# Patient Record
Sex: FEMALE | Race: WHITE | Employment: FULL TIME | URBAN - METROPOLITAN AREA
[De-identification: names, ages, dates, MRNs, and addresses within clinical notes are randomized per-mention and may not be internally consistent; named-entity substitution may affect disease eponyms.]

---

## 2017-01-12 ENCOUNTER — ROUTINE PRENATAL (OUTPATIENT)
Dept: OBGYN CLINIC | Age: 32
End: 2017-01-12

## 2017-01-12 VITALS
SYSTOLIC BLOOD PRESSURE: 114 MMHG | HEIGHT: 68 IN | WEIGHT: 234 LBS | BODY MASS INDEX: 35.46 KG/M2 | DIASTOLIC BLOOD PRESSURE: 70 MMHG | RESPIRATION RATE: 19 BRPM

## 2017-01-12 DIAGNOSIS — Z34.83 ENCOUNTER FOR SUPERVISION OF OTHER NORMAL PREGNANCY IN THIRD TRIMESTER: Primary | ICD-10-CM

## 2017-01-12 LAB
GTT, 1 HR, GLUCOLA, EXTERNAL: 126
HCT, EXTERNAL: 32.8
HGB, EXTERNAL: 10.9
PLATELET CNT,   EXTERNAL: 235

## 2017-01-12 NOTE — PROGRESS NOTES
_ [facility]  [email or web address]  [phone]    Virgilio Merchant MD, FACOG     Follow-up OB visit    Vitals:    17 1448   BP: 114/70   Resp: 19   Weight: 234 lb (106.1 kg)   Height: 5' 8\" (1.727 m)       The patient reports the following concerns: none    Flu vaccine: received this season  Tdap: next visit  (select n/a if first or second trimester)    See PN flowsheet for exam    33 y.o.  29w5d   Encounter Diagnosis   Name Primary?     Encounter for supervision of other normal pregnancy in third trimester Yes        [] SAB/bleeding precautions reviewed   [] PTL/PPROM precautions reviewed   [] Labor precautions reviewed   [] Fetal kick counts discussed   [] Labs reviewed with patient   [] Herbert Loyd precautions reviewed   [] Consent reviewed   [] Handouts given to pt   [] Glucola handout    [] GBS/labor/Magic Hour handout   []    []    []    []    Follow-up Disposition: Not on File    Orders Placed This Encounter    GLUCOSE, GESTATIONAL, 1 HR TOLERANCE    CBC W/O DIFF       Virgilio Merchant MD

## 2017-01-12 NOTE — MR AVS SNAPSHOT
Visit Information Date & Time Provider Department Dept. Phone Encounter #  
 1/12/2017  2:40 PM Casey Allen MD [facility] [phone] Upcoming Health Maintenance Date Due  
 PAP AKA CERVICAL CYTOLOGY 8/18/2019 Allergies as of 1/12/2017  Review Complete On: 1/12/2017 By: Andrea Luna Severity Noted Reaction Type Reactions Penicillins  05/12/2013    Rash, Nausea and Vomiting  
 Sulfa (Sulfonamide Antibiotics)  05/12/2013    Hives Current Immunizations  Never Reviewed Name Date Influenza Vaccine (Quad) PF 10/11/2016 Tdap 5/14/2013 12:35 PM  
  
 Not reviewed this visit You Were Diagnosed With   
  
 Codes Comments Encounter for supervision of other normal pregnancy in third trimester    -  Primary ICD-10-CM: Z34.83 Vitals BP Resp Height(growth percentile) Weight(growth percentile) LMP BMI  
 114/70 (BP 1 Location: Right arm, BP Patient Position: Sitting) 19 5' 8\" (1.727 m) 234 lb (106.1 kg) 06/20/2016 (Exact Date) 35.58 kg/m2 OB Status Smoking Status Pregnant Never Smoker BMI and BSA Data Body Mass Index Body Surface Area 35.58 kg/m 2 2.26 m 2 Preferred Pharmacy Pharmacy Name Phone [id number] [phone] , [address] [place] [address]  [phone] Your Updated Medication List  
  
   
This list is accurate as of: 1/12/17  2:53 PM.  Always use your most recent med list.  
  
  
  
  
 calcium carbonate 400 mg [facility] Commonly known as:  MYLANTA Take 1 Tab by mouth three (3) times daily (with meals). PRENATAL 19 (WITH DOCUSATE) 29-1 mg Tab Generic drug:  prenatal vit-fe fum-fa-dss Take  by mouth. We Performed the Following CBC W/O DIFF [92841 CPT(R)] GLUCOSE, GESTATIONAL, 1 HR TOLERANCE [63374 CPT(R)] Patient Instructions Learning About Glucose Testing During Pregnancy What is a glucose test? 
 A glucose test measures the body's ability to use a type of sugar, called glucose. Glucose is the body's main source of energy. This test is used to check pregnant women for gestational diabetes. Gestational diabetes is a form of diabetes that develops during pregnancy and then usually goes away after the baby is born. When you have this condition, the insulin in your body is not able to keep your blood sugar in a normal range. If you do not control your blood sugar, your baby can grow too big and may have problems after birth. How is a glucose test done? There are different ways to test for gestational diabetes. One method is done in two steps. 1. You do not need to stop eating or drinking before the first step. You will drink a liquid that contains 50 grams of sugar (glucose). Your blood sample is taken 1 hour later. If you don't have a lot of sugar in your blood, you do not have gestational diabetes. 2. If you do have a lot of sugar in your blood, you are asked to do the second step, the oral glucose tolerance test (OGTT). With the OGTT, you cannot eat or drink for at least 8 hours before the test. Then a blood sample is taken when you arrive for the test. This is your fasting blood glucose value. It provides a baseline for comparing other glucose values. You drink a liquid that contains 100 grams of sugar (glucose). Your blood sample is taken 3 hours later to see how much sugar is in your blood. If you don't have a lot of sugar in your blood, you don't have gestational diabetes. If you do have a lot of sugar in your blood, you do have gestational diabetes. A different method is done in one step. It is another version of the OGTT. You cannot eat or drink for at least 8 hours before the test. Then a blood sample is taken when you arrive for the test. This is your fasting blood glucose value. It provides a baseline for comparing other glucose values. You drink a liquid that contains 75 grams of sugar (glucose). Your blood sample is taken 1 and then 2 hours later to see how much sugar is in your blood. If you don't have a lot of sugar in your blood, you do not have gestational diabetes. If you do have a lot of sugar in your blood, you do have gestational diabetes. What else should you know about the test? 
Your blood glucose level may drop very low toward the end of the glucose test. If this happens, you may feel weak, hungry, and restless. Tell your doctor if you have these symptoms. The test usually will be stopped. You may vomit after drinking the sweet liquid. If this happens, the test may need to be repeated at a later time. Your doctor may do more glucose tests at different times during your pregnancy. Follow-up care is a key part of your treatment and safety. Be sure to make and go to all appointments, and call your doctor if you are having problems. It's also a good idea to know your test results and keep a list of the medicines you take. Where can you learn more? Go to http://ryne-marvin.info/. Enter V980 in the search box to learn more about \"Learning About Glucose Testing During Pregnancy. \" Current as of: Rhiannon 15, 2016 Content Version: 11.1 © 9920-8477 Location, Incorporated. Care instructions adapted under license by Fipeo (which disclaims liability or warranty for this information). If you have questions about a medical condition or this instruction, always ask your healthcare professional. [place] [address] any warranty or liability for your use of this information. Introducing [facility] & HEALTH SERVICES! Dear Aneesh Chamorro: Thank you for requesting a Espion Limited account. Our records indicate that you already have an active Espion Limited account. You can access your account anytime at [email or web address] Did you know that you can access your hospital and ER discharge instructions at any time in Golimi? You can also review all of your test results from your hospital stay or ER visit. Additional Information If you have questions, please visit the Frequently Asked Questions section of the Golimi website at [email or web address] and some sugar. [email or web address] Remember, Golimi is NOT to be used for urgent needs. For medical emergencies, dial 911. Now available from your iPhone and Android! Please provide this summary of care documentation to your next provider. Your primary care clinician is listed as [place]. If you have any questions after today's visit, please call [phone].

## 2017-01-12 NOTE — PATIENT INSTRUCTIONS
Learning About Glucose Testing During Pregnancy  What is a glucose test?  A glucose test measures the body's ability to use a type of sugar, called glucose. Glucose is the body's main source of energy. This test is used to check pregnant women for gestational diabetes. Gestational diabetes is a form of diabetes that develops during pregnancy and then usually goes away after the baby is born. When you have this condition, the insulin in your body is not able to keep your blood sugar in a normal range. If you do not control your blood sugar, your baby can grow too big and may have problems after birth. How is a glucose test done? There are different ways to test for gestational diabetes. One method is done in two steps. 1. You do not need to stop eating or drinking before the first step. You will drink a liquid that contains 50 grams of sugar (glucose). Your blood sample is taken 1 hour later. If you don't have a lot of sugar in your blood, you do not have gestational diabetes. 2. If you do have a lot of sugar in your blood, you are asked to do the second step, the oral glucose tolerance test (OGTT). With the OGTT, you cannot eat or drink for at least 8 hours before the test. Then a blood sample is taken when you arrive for the test. This is your fasting blood glucose value. It provides a baseline for comparing other glucose values. You drink a liquid that contains 100 grams of sugar (glucose). Your blood sample is taken 3 hours later to see how much sugar is in your blood. If you don't have a lot of sugar in your blood, you don't have gestational diabetes. If you do have a lot of sugar in your blood, you do have gestational diabetes. A different method is done in one step. It is another version of the OGTT. You cannot eat or drink for at least 8 hours before the test. Then a blood sample is taken when you arrive for the test. This is your fasting blood glucose value.  It provides a baseline for comparing other glucose values. You drink a liquid that contains 75 grams of sugar (glucose). Your blood sample is taken 1 and then 2 hours later to see how much sugar is in your blood. If you don't have a lot of sugar in your blood, you do not have gestational diabetes. If you do have a lot of sugar in your blood, you do have gestational diabetes. What else should you know about the test?  Your blood glucose level may drop very low toward the end of the glucose test. If this happens, you may feel weak, hungry, and restless. Tell your doctor if you have these symptoms. The test usually will be stopped. You may vomit after drinking the sweet liquid. If this happens, the test may need to be repeated at a later time. Your doctor may do more glucose tests at different times during your pregnancy. Follow-up care is a key part of your treatment and safety. Be sure to make and go to all appointments, and call your doctor if you are having problems. It's also a good idea to know your test results and keep a list of the medicines you take. Where can you learn more? Go to http://ryne-marvin.info/. Enter T104 in the search box to learn more about \"Learning About Glucose Testing During Pregnancy. \"  Current as of: Rhiannon 15, 2016  Content Version: 11.1  © 0170-2004 Smart Furniture, Incorporated. Care instructions adapted under license by Creative Market (which disclaims liability or warranty for this information). If you have questions about a medical condition or this instruction, always ask your healthcare professional. [place] [address] any warranty or liability for your use of this information.

## 2017-01-13 LAB
ERYTHROCYTE [DISTWIDTH] IN BLOOD BY AUTOMATED COUNT: 13 % (ref 12.3–15.4)
GLUCOSE 1H P 50 G GLC PO SERPL-MCNC: 126 MG/DL (ref 65–139)
HCT VFR BLD AUTO: 32.8 % (ref 34–46.6)
HGB BLD-MCNC: 10.9 G/DL (ref 11.1–15.9)
MCH RBC QN AUTO: 30.9 PG (ref 26.6–33)
MCHC RBC AUTO-ENTMCNC: 33.2 G/DL (ref 31.5–35.7)
MCV RBC AUTO: 93 FL (ref 79–97)
PLATELET # BLD AUTO: 235 X10E3/UL (ref 150–379)
RBC # BLD AUTO: 3.53 X10E6/UL (ref 3.77–5.28)
WBC # BLD AUTO: 8.6 X10E3/UL (ref 3.4–10.8)

## 2017-01-16 NOTE — PROGRESS NOTES
28 week labs OK except anemia. Recommend iron per protocol. Notify patient and add to PN Labs.   Add #.# hgb (g/dL) to pregnancy  problem list

## 2017-01-17 NOTE — PROGRESS NOTES
PNL/PL updated.   Patient notified via Genomast and was Read by Galileo First at 1/16/2017 11:21 AM.

## 2017-01-26 ENCOUNTER — ROUTINE PRENATAL (OUTPATIENT)
Dept: OBGYN CLINIC | Age: 32
End: 2017-01-26

## 2017-01-26 VITALS
WEIGHT: 236 LBS | HEIGHT: 68 IN | BODY MASS INDEX: 35.77 KG/M2 | SYSTOLIC BLOOD PRESSURE: 114 MMHG | DIASTOLIC BLOOD PRESSURE: 72 MMHG

## 2017-01-26 DIAGNOSIS — Z34.80 PRENATAL CARE OF MULTIGRAVIDA, ANTEPARTUM: Primary | ICD-10-CM

## 2017-01-26 NOTE — PATIENT INSTRUCTIONS
Weeks 30 to 32 of Your Pregnancy: Care Instructions  Your Care Instructions    You have made it to the final months of your pregnancy. By now, your baby is really starting to look like a baby, with hair and plump skin. As you enter the final weeks of pregnancy, the reality of having a baby may start to set in. This is the time to settle on a name, get your household in order, set up a safe nursery, and find quality  if needed. Doing these things in advance will allow you to focus on caring for and enjoying your new baby. You may also want to have a tour of your hospital's labor and delivery unit to get a better idea of what to expect while you are in the hospital.  During these last months, it is very important to take good care of yourself and pay attention to what your body needs. If your doctor says it is okay for you to work, don't push yourself too hard. Use the tips provided in this care sheet to ease heartburn and care for varicose veins. If you haven't already had the Tdap shot during this pregnancy, talk to your doctor about getting it. It will help protect your  against pertussis infection. Follow-up care is a key part of your treatment and safety. Be sure to make and go to all appointments, and call your doctor if you are having problems. It's also a good idea to know your test results and keep a list of the medicines you take. How can you care for yourself at home? Pay attention to your baby's movements  · You should feel your baby move several times every day. · Your baby now turns less, and kicks and jabs more. · Your baby sleeps 20 to 45 minutes at a time and is more active at certain times of day. · If your doctor wants you to count your baby's kicks:  ¨ Empty your bladder, and lie on your side or relax in a comfortable chair. ¨ Write down your start time. ¨ Pay attention only to your baby's movements. Count any movement except hiccups.   ¨ After you have counted 10 movements, write down your stop time. ¨ Write down how many minutes it took for your baby to move 10 times. ¨ If an hour goes by and you have not recorded 10 movements, have something to eat or drink and then count for another hour. If you do not record 10 movements in either hour, call your doctor. Ease heartburn  · Eat small, frequent meals. · Do not eat chocolate, peppermint, or very spicy foods. Avoid drinks with caffeine, such as coffee, tea, and sodas. · Avoid bending over or lying down after meals. · Talk a short walk after you eat. · If heartburn is a problem at night, do not eat for 2 hours before bedtime. · Take antacids like Mylanta, Maalox, Rolaids, or Tums. Do not take antacids that have sodium bicarbonate. Care for varicose veins  · Varicose veins are blood vessels that stretch out with the extra blood during pregnancy. Your legs may ache or throb. Most varicose veins will go away after the birth. · Avoid standing for long periods of time. Sit with your legs crossed at the ankles, not the knees. · Sit with your feet propped up. · Avoid tight clothing or stockings. Wear support hose. · Exercise regularly. Try walking for at least 30 minutes a day. Where can you learn more? Go to http://ryne-marvin.info/. Enter X525 in the search box to learn more about \"Weeks 30 to 32 of Your Pregnancy: Care Instructions. \"  Current as of: May 30, 2016  Content Version: 11.1  © 4588-6429 NGI. Care instructions adapted under license by Sopheon (which disclaims liability or warranty for this information). If you have questions about a medical condition or this instruction, always ask your healthcare professional. [place] [address] any warranty or liability for your use of this information.

## 2017-01-26 NOTE — PROGRESS NOTES
_ [facility]  [email or web address]  [phone]    Casey Rojo MD, FACOG     Follow-up OB visit    Vitals:    17 1314   BP: 114/72   Weight: 236 lb (107 kg)   Height: 5' 8\" (1.727 m)       The patient reports the following concerns: none    Flu vaccine: received this season  Tdap: would like to recieve at next visit. (select n/a if first or second trimester)    See PN flowsheet for exam  Chest  · Respiratory Effort: breathing normal        Auscultation: normal breath sounds, clear bilaterally    Cardiovascular  · Heart:  · Auscultation: regular rate and rhythm without murmur, normal S1, S2    Ext: no c/t/ trace edema b/l    33 y.o.  31w5d   Encounter Diagnosis   Name Primary?  Prenatal care of multigravida, antepartum Yes     Disc typical dyspnea of pregnancy, notify MD if NI  Dvt/pe precautions   [] SAB/bleeding precautions reviewed   [] PTL/PPROM precautions reviewed   [] Labor precautions reviewed   [] Fetal kick counts discussed   [] Labs reviewed with patient   [] Paul Kand precautions reviewed   [] Consent reviewed   [] Handouts given to pt   [] Glucola handout    [] GBS/labor/Magic Hour handout   []    []    []    []    Follow-up Disposition:  Return in about 2 weeks (around 2017) for Follow up OB visit. No orders of the defined types were placed in this encounter.       Casey Rojo MD

## 2017-01-26 NOTE — MR AVS SNAPSHOT
Visit Information Date & Time Provider Department Dept. Phone Encounter #  
 1/26/2017  1:10 PM Shara Sevilla MD [facility] 90 [id number] Follow-up Instructions Return in about 2 weeks (around 2/9/2017) for Follow up OB visit. Your Appointments 2/9/2017  1:10 PM  
OB VISIT with MD Harsha Ellison ([address] [place] [address]) Appt Note: 2wk fob  34wks   TP  
 [address] [place] [address]  
[phone]  
  
   
 [address] [place] [address]  
  
    
 2/16/2017  1:00 PM  
ULTRASOUND with Army Bailey Harsha Gilmore ([address] [place] [address]) Appt Note: 1wk fob with growth check u/s    TP  
 [address] [place] [address]  
[phone]  
  
   
 [address] [place] [address]  
  
    
 2/16/2017  1:30 PM  
OB VISIT with MD Harsha Ellison ([address] [place] [address] Road) Appt Note: 1wk fob with growth check u/s    TP  
 [address] [place] [address]  
[phone] Upcoming Health Maintenance Date Due  
 PAP AKA CERVICAL CYTOLOGY 8/18/2019 Allergies as of 1/26/2017  Review Complete On: 1/26/2017 By: Jacy Mayer LPN Severity Noted Reaction Type Reactions Penicillins  05/12/2013    Rash, Nausea and Vomiting  
 Sulfa (Sulfonamide Antibiotics)  05/12/2013    Hives Current Immunizations  Never Reviewed Name Date Influenza Vaccine (Quad) PF 10/11/2016 Tdap 5/14/2013 12:35 PM  
  
 Not reviewed this visit You Were Diagnosed With   
  
 Codes Comments Prenatal care of multigravida, antepartum    -  Primary ICD-10-CM: Z34.80 ICD-9-CM: V22.1 Vitals BP Height(growth percentile) Weight(growth percentile) LMP BMI OB Status 114/72 5' 8\" (1.727 m) 236 lb (107 kg) 06/20/2016 (Exact Date) 35.88 kg/m2 Pregnant Smoking Status Never Smoker BMI and BSA Data Body Mass Index Body Surface Area  
 35.88 kg/m 2 2.27 m 2 Preferred Pharmacy Pharmacy Name Phone [id number] [address], [id number] [place] [address]  [phone] Your Updated Medication List  
  
   
This list is accurate as of: 17  1:15 PM.  Always use your most recent med list.  
  
  
  
  
 calcium carbonate 400 mg Chew Commonly known as:  MYLANTA Take 1 Tab by mouth three (3) times daily (with meals). PRENATAL 19 (WITH DOCUSATE) 29-1 mg Tab Generic drug:  prenatal vit-fe fum-fa-dss Take  by mouth. Follow-up Instructions Return in about 2 weeks (around 2017) for Follow up OB visit. Patient Instructions Weeks 30 to 32 of Your Pregnancy: Care Instructions Your Care Instructions You have made it to the final months of your pregnancy. By now, your baby is really starting to look like a baby, with hair and plump skin. As you enter the final weeks of pregnancy, the reality of having a baby may start to set in. This is the time to settle on a name, get your household in order, set up a safe nursery, and find quality  if needed. Doing these things in advance will allow you to focus on caring for and enjoying your new baby. You may also want to have a tour of your hospital's labor and delivery unit to get a better idea of what to expect while you are in the hospital. 
During these last months, it is very important to take good care of yourself and pay attention to what your body needs. If your doctor says it is okay for you to work, don't push yourself too hard. Use the tips provided in this care sheet to ease heartburn and care for varicose veins. If you haven't already had the Tdap shot during this pregnancy, talk to your doctor about getting it. It will help protect your  against pertussis infection. Follow-up care is a key part of your treatment and safety. Be sure to make and go to all appointments, and call your doctor if you are having problems. It's also a good idea to know your test results and keep a list of the medicines you take. How can you care for yourself at home? Pay attention to your baby's movements · You should feel your baby move several times every day. · Your baby now turns less, and kicks and jabs more. · Your baby sleeps 20 to 45 minutes at a time and is more active at certain times of day. · If your doctor wants you to count your baby's kicks: 
¨ Empty your bladder, and lie on your side or relax in a comfortable chair. ¨ Write down your start time. ¨ Pay attention only to your baby's movements. Count any movement except hiccups. ¨ After you have counted 10 movements, write down your stop time. ¨ Write down how many minutes it took for your baby to move 10 times. ¨ If an hour goes by and you have not recorded 10 movements, have something to eat or drink and then count for another hour. If you do not record 10 movements in either hour, call your doctor. Ease heartburn · Eat small, frequent meals. · Do not eat chocolate, peppermint, or very spicy foods. Avoid drinks with caffeine, such as coffee, tea, and sodas. · Avoid bending over or lying down after meals. · Talk a short walk after you eat. · If heartburn is a problem at night, do not eat for 2 hours before bedtime. · Take antacids like Mylanta, Maalox, Rolaids, or Tums. Do not take antacids that have sodium bicarbonate. Care for varicose veins · Varicose veins are blood vessels that stretch out with the extra blood during pregnancy. Your legs may ache or throb. Most varicose veins will go away after the birth. · Avoid standing for long periods of time. Sit with your legs crossed at the ankles, not the knees. · Sit with your feet propped up. · Avoid tight clothing or stockings. Wear support hose. · Exercise regularly. Try walking for at least 30 minutes a day. Where can you learn more? Go to http://ryne-marvin.info/. Enter P939 in the search box to learn more about \"Weeks 30 to 32 of Your Pregnancy: Care Instructions. \" Current as of: May 30, 2016 Content Version: 11.1 © 2585-2677 Resilinc. Care instructions adapted under license by StarNet Interactive (which disclaims liability or warranty for this information). If you have questions about a medical condition or this instruction, always ask your healthcare professional. [address] any warranty or liability for your use of this information. Introducing hospitals & HEALTH SERVICES! Dear Bob Ozuna: Thank you for requesting a Jovie account. Our records indicate that you already have an active Jovie account. You can access your account anytime at [email or web address] Did you know that you can access your hospital and ER discharge instructions at any time in Jovie? You can also review all of your test results from your hospital stay or ER visit. Additional Information If you have questions, please visit the Frequently Asked Questions section of the Jovie website at [email or web address] Remember, Jovie is NOT to be used for urgent needs. For medical emergencies, dial 911. Now available from your iPhone and Android! Please provide this summary of care documentation to your next provider. Your primary care clinician is listed as Nevaeh Dhaliwal. If you have any questions after today's visit, please call [phone].

## 2017-02-06 ENCOUNTER — TELEPHONE (OUTPATIENT)
Dept: OBGYN CLINIC | Age: 32
End: 2017-02-06

## 2017-02-06 NOTE — TELEPHONE ENCOUNTER
Humberto Arias, then I would just try to manage the sx with OTC meds, fluids and rest.  I hope she feels better soon!

## 2017-02-06 NOTE — TELEPHONE ENCOUNTER
Yes: She can take tamiflu in pregnancy. Rx: 75mg po BID x 5 days     To help with symptoms if tests flu +  But should be started within 48 hours of onset of symptoms, or then it may not be worth it. I hope she feels better soon!   Rec PCP fu if NI in sx  Agree with safer meds for sx in pregnancy: refer to website/new OB list    trp

## 2017-02-06 NOTE — TELEPHONE ENCOUNTER
11: 25AM   31 WKS pt called and stated she was seen at the [address] and tested +Flu B and was told she cannot take Tamiflu:  Drug C Category. C/O fever,cough,sinus pressure and HA. Advised pt of the following OTC medications for pregnancy:  Robitussin plain or DM  Tylenol(acetaminophen)  Sudafed(regular or 12 hours)  Sucrets and Chlor-Trimeton  She verbalized understanding and stated she had the FLU vaccine in October 2016. Please advise.

## 2017-02-09 ENCOUNTER — ROUTINE PRENATAL (OUTPATIENT)
Dept: OBGYN CLINIC | Age: 32
End: 2017-02-09

## 2017-02-09 VITALS
WEIGHT: 238 LBS | SYSTOLIC BLOOD PRESSURE: 112 MMHG | DIASTOLIC BLOOD PRESSURE: 78 MMHG | HEIGHT: 68 IN | BODY MASS INDEX: 36.07 KG/M2

## 2017-02-09 DIAGNOSIS — Z34.80 PRENATAL CARE OF MULTIGRAVIDA, ANTEPARTUM: Primary | ICD-10-CM

## 2017-02-09 RX ORDER — ACETAMINOPHEN 325 MG/1
TABLET ORAL
COMMUNITY
End: 2017-03-23

## 2017-02-09 NOTE — PROGRESS NOTES
_ [facility]  [email or web address]  [phone]    Eric Allen MD, FACOG     Follow-up OB visit    Chief Complaint   Patient presents with    Routine Prenatal Visit       Vitals:    17 1325   BP: 112/78   Weight: 238 lb (108 kg)   Height: 5' 8\" (1.727 m)       Patient Active Problem List    Diagnosis Date Noted    Varicose vein of leg 2016    Pregnancy 2016    Abdominal pain in pregnancy 2013       The patient reports the following concerns: Patient tested positive for the Flu on Monday and had a fever Saturday through Monday. Patient has been taking tylenol, robitussin, and sudafed and states she has not had any relief. Patient reports feeling like she pulled a muscle on the right side of her abdomen from coughing. Flu vaccine: received this season  Tdap: will recieve at next visit due to having the Flu today. See PN flowsheet for exam    33 y.o.  33w5d   Encounter Diagnosis   Name Primary?  Prenatal care of multigravida, antepartum Yes     Viral prec  Note for work today/tomorrow   [] SAB/bleeding precautions reviewed   [x] PTL/PPROM precautions reviewed   [] Labor precautions reviewed   [] Fetal kick counts discussed   [] Labs reviewed with patient   [] Jeanne Saybozena precautions reviewed   [] Consent reviewed   [] Handouts given to pt   [] Glucola handout    [] GBS/labor/Magic Hour handout   []    []    []    []    Follow-up Disposition:  Return in about 1 week (around 2017) for Follow up OB visit. No orders of the defined types were placed in this encounter.       Eric Allen MD

## 2017-02-09 NOTE — LETTER
2/9/2017 1:39 PM 
 
Ms. Kia Sullivan [phone] To Whom it may concern; 
 
 Roxana Boo is under my care. She was seen in our office today 2/9/17. Following this appointment today, the doctor has recommended her stay out of work for the remainder of today 2/9/17 and all of tomorrow 2/10/17. Sincerely, Bernard Chua MD

## 2017-02-09 NOTE — PATIENT INSTRUCTIONS

## 2017-02-09 NOTE — MR AVS SNAPSHOT
Visit Information Date & Time Provider Department Dept. Phone Encounter #  
 2/9/2017  1:10 PM Frantz Lee MD [facility] 90 [id number] Your Appointments 2/16/2017  2:30 PM  
ULTRASOUND with MARILU Rubio ([address]) Appt Note: 1wk fob with growth check u/s TP  
 [address] [place] [address]  
[phone]  
  
   
 [facility] [address]  
  
    
 2/16/2017  3:00 PM  
OB VISIT with MD Harsha Alford ([address]) Appt Note: 1wk fob with growth check u/s TP  
 [address] [phone]  
[facility] [address] Upcoming Health Maintenance Date Due  
 PAP AKA CERVICAL CYTOLOGY 8/18/2019 Allergies as of 2/9/2017  Review Complete On: 2/9/2017 By: Frank Noble LPN Severity Noted Reaction Type Reactions Penicillins  05/12/2013    Rash, Nausea and Vomiting  
 Sulfa (Sulfonamide Antibiotics)  05/12/2013    Hives Current Immunizations  Reviewed on 2/6/2017 Name Date Influenza Vaccine (Quad) PF 10/11/2016 Tdap 5/14/2013 12:35 PM  
  
 Not reviewed this visit Vitals BP Height(growth percentile) Weight(growth percentile) LMP BMI OB Status 112/78 5' 8\" (1.727 m) 238 lb (108 kg) 06/20/2016 (Exact Date) 36.19 kg/m2 Pregnant Smoking Status Never Smoker BMI and BSA Data Body Mass Index Body Surface Area  
 36.19 kg/m 2 2.28 m 2 Preferred Pharmacy Pharmacy Name Phone [id number] [address] [facility], [address] [place] [address]  [phone] Your Updated Medication List  
  
   
This list is accurate as of: 2/9/17  1:29 PM.  Always use your most recent med list.  
  
  
  
  
 calcium carbonate 400 mg [address] [facility] Commonly known as:  MYLANTA Take 1 Tab by mouth three (3) times daily (with meals). PRENATAL 19 (WITH DOCUSATE) 29-1 mg Tab Generic drug:  prenatal vit-fe fum-fa-dss Take  by mouth. ROBITUSSIN PE PO Take  by mouth. SUDAFED PO Take  by mouth. TYLENOL 325 mg tablet Generic drug:  acetaminophen Take  by mouth every four (4) hours as needed for Pain. Patient Instructions Weeks 34 to 36 of Your Pregnancy: Care Instructions Your Care Instructions By now, your baby and your belly have grown quite large. It is almost time to give birth. A full-term pregnancy can deliver between 37 and 42 weeks. Your baby's lungs are almost ready to breathe air. The bones in your baby's head are now firm enough to protect it, but soft enough to move down through the birth canal. 
You may feel excited, happy, anxious, or scared. You may wonder how you will know if you are in labor or what to expect during labor. Try to be flexible in your expectations of the birth. Because each birth is different, there is no way to know exactly what childbirth will be like for you. This care sheet will help you know what to expect and how to prepare. This may make your childbirth easier. If you haven't already had the Tdap shot during this pregnancy, talk to your doctor about getting it. It will help protect your  against pertussis infection. In the 36th week, most women have a test for group B streptococcus (GBS). GBS is a common bacteria that can live in the vagina and rectum. It can make your baby sick after birth. If you test positive, you will get antibiotics during labor. The medicine will keep your baby from getting the bacteria. Follow-up care is a key part of your treatment and safety. Be sure to make and go to all appointments, and call your doctor if you are having problems. It's also a good idea to know your test results and keep a list of the medicines you take. How can you care for yourself at home? Learn about pain relief choices · Pain is different for every woman. Talk with your doctor about your feelings about pain. · You can choose from several types of pain relief. These include medicine or breathing techniques, as well as comfort measures. You can use more than one option. · If you choose to have pain medicine during labor, talk to your doctor about your options. Some medicines lower anxiety and help with some of the pain. Others make your lower body numb so that you won't feel pain. · Be sure to tell your doctor about your pain medicine choice before you start labor or very early in your labor. You may be able to change your mind as labor progresses. · Rarely, a woman is put to sleep by medicine given through a mask or an IV. Labor and delivery · The first stage of labor has three parts: early, active, and transition. ¨ Most women have early labor at home. You can stay busy or rest, eat light snacks, drink clear fluids, and start counting contractions. ¨ When talking during a contraction gets hard, you may be moving to active labor. During active labor, you should head for the hospital if you are not there already. ¨ You are in active labor when contractions come every 3 to 4 minutes and last about 60 seconds. Your cervix is opening more rapidly. ¨ If your water breaks, contractions will come faster and stronger. ¨ During transition, your cervix is stretching, and contractions are coming more rapidly. ¨ You may want to push, but your cervix might not be ready. Your doctor will tell you when to push. · The second stage starts when your cervix is completely opened and you are ready to push. ¨ Contractions are very strong to push the baby down the birth canal. 
¨ You will feel the urge to push. You may feel like you need to have a bowel movement. ¨ You may be coached to push with contractions. These contractions will be very strong, but you will not have them as often.  You can get a little rest between contractions. ¨ You may be emotional and irritable. You may not be aware of what is going on around you. ¨ One last push, and your baby is born. · The third stage is when a few more contractions push out the placenta. This may take 30 minutes or less. · The fourth stage is the welcome recovery. You may feel overwhelmed with emotions and exhausted but alert. This is a good time to start breastfeeding. Where can you learn more? Go to http://ryne-marvin.info/. Enter B563 in the search box to learn more about \"Weeks 34 to 36 of Your Pregnancy: Care Instructions. \" Current as of: May 30, 2016 Content Version: 11.1 © 7992-6788 [employer]. Care instructions adapted under license by [employer] (which disclaims liability or warranty for this information). If you have questions about a medical condition or this instruction, always ask your healthcare professional. [place] [address] any warranty or liability for your use of this information. Introducing [facility] & HEALTH SERVICES! Dear Mayco Celis: Thank you for requesting a Cybronics account. Our records indicate that you already have an active Cybronics account. You can access your account anytime at [email or web address] Did you know that you can access your hospital and ER discharge instructions at any time in Cybronics? You can also review all of your test results from your hospital stay or ER visit. Additional Information If you have questions, please visit the Frequently Asked Questions section of the Cybronics website at [email or web address] Remember, Cybronics is NOT to be used for urgent needs. For medical emergencies, dial 911. Now available from your iPhone and Android! Please provide this summary of care documentation to your next provider. Your primary care clinician is listed as Arelis Urrutia.  If you have any questions after today's visit, please call [phone].

## 2017-02-16 ENCOUNTER — ROUTINE PRENATAL (OUTPATIENT)
Dept: OBGYN CLINIC | Age: 32
End: 2017-02-16

## 2017-02-16 VITALS
SYSTOLIC BLOOD PRESSURE: 108 MMHG | WEIGHT: 237 LBS | DIASTOLIC BLOOD PRESSURE: 64 MMHG | BODY MASS INDEX: 35.92 KG/M2 | HEIGHT: 68 IN

## 2017-02-16 DIAGNOSIS — Z23 ENCOUNTER FOR IMMUNIZATION: ICD-10-CM

## 2017-02-16 DIAGNOSIS — Z34.80 PRENATAL CARE OF MULTIGRAVIDA, ANTEPARTUM: Primary | ICD-10-CM

## 2017-02-16 NOTE — PATIENT INSTRUCTIONS

## 2017-02-16 NOTE — MR AVS SNAPSHOT
Visit Information Date & Time Provider Department Dept. Phone Encounter #  
 2/16/2017  3:00 PM [facility] [id number] Upcoming Health Maintenance Date Due  
 PAP AKA CERVICAL CYTOLOGY 8/18/2019 Allergies as of 2/16/2017  Review Complete On: 2/16/2017 By: Carolina Quinn LPN Severity Noted Reaction Type Reactions Penicillins  05/12/2013    Rash, Nausea and Vomiting  
 Sulfa (Sulfonamide Antibiotics)  05/12/2013    Hives Current Immunizations  Reviewed on 2/6/2017 Name Date Influenza Vaccine (Quad) PF 10/11/2016 Tdap 5/14/2013 12:35 PM  
  
 Not reviewed this visit Vitals BP Height(growth percentile) Weight(growth percentile) LMP BMI OB Status 108/64 5' 8\" (1.727 m) 237 lb (107.5 kg) 06/20/2016 (Exact Date) 36.04 kg/m2 Pregnant Smoking Status Never Smoker Vitals History BMI and BSA Data Body Mass Index Body Surface Area 36.04 kg/m 2 2.27 m 2 Preferred Pharmacy Pharmacy Name Phone [id number] [phone]  [phone] Your Updated Medication List  
  
   
This list is accurate as of: 2/16/17  3:25 PM.  Always use your most recent med list.  
  
  
  
  
 calcium carbonate 400 mg Rickey Valderrama Commonly known as:  MYLANTA Take 1 Tab by mouth three (3) times daily (with meals). PRENATAL 19 (WITH DOCUSATE) 29-1 mg Tab Generic drug:  prenatal vit-fe fum-fa-dss Take  by mouth. ROBITUSSIN PE PO Take  by mouth. SUDAFED PO Take  by mouth. TYLENOL 325 mg tablet Generic drug:  acetaminophen Take  by mouth every four (4) hours as needed for Pain. Patient Instructions Weeks 34 to 36 of Your Pregnancy: Care Instructions Your Care Instructions By now, your baby and your belly have grown quite large.  It is almost time to give birth. A full-term pregnancy can deliver between 37 and 42 weeks. Your baby's lungs are almost ready to breathe air. The bones in your baby's head are now firm enough to protect it, but soft enough to move down through the birth canal. 
You may feel excited, happy, anxious, or scared. You may wonder how you will know if you are in labor or what to expect during labor. Try to be flexible in your expectations of the birth. Because each birth is different, there is no way to know exactly what childbirth will be like for you. This care sheet will help you know what to expect and how to prepare. This may make your childbirth easier. If you haven't already had the Tdap shot during this pregnancy, talk to your doctor about getting it. It will help protect your  against pertussis infection. In the 36th week, most women have a test for group B streptococcus (GBS). GBS is a common bacteria that can live in the vagina and rectum. It can make your baby sick after birth. If you test positive, you will get antibiotics during labor. The medicine will keep your baby from getting the bacteria. Follow-up care is a key part of your treatment and safety. Be sure to make and go to all appointments, and call your doctor if you are having problems. It's also a good idea to know your test results and keep a list of the medicines you take. How can you care for yourself at home? Learn about pain relief choices · Pain is different for every woman. Talk with your doctor about your feelings about pain. · You can choose from several types of pain relief. These include medicine or breathing techniques, as well as comfort measures. You can use more than one option. · If you choose to have pain medicine during labor, talk to your doctor about your options. Some medicines lower anxiety and help with some of the pain. Others make your lower body numb so that you won't feel pain. · Be sure to tell your doctor about your pain medicine choice before you start labor or very early in your labor. You may be able to change your mind as labor progresses. · Rarely, a woman is put to sleep by medicine given through a mask or an IV. Labor and delivery · The first stage of labor has three parts: early, active, and transition. ¨ Most women have early labor at home. You can stay busy or rest, eat light snacks, drink clear fluids, and start counting contractions. ¨ When talking during a contraction gets hard, you may be moving to active labor. During active labor, you should head for the hospital if you are not there already. ¨ You are in active labor when contractions come every 3 to 4 minutes and last about 60 seconds. Your cervix is opening more rapidly. ¨ If your water breaks, contractions will come faster and stronger. ¨ During transition, your cervix is stretching, and contractions are coming more rapidly. ¨ You may want to push, but your cervix might not be ready. Your doctor will tell you when to push. · The second stage starts when your cervix is completely opened and you are ready to push. ¨ Contractions are very strong to push the baby down the birth canal. 
¨ You will feel the urge to push. You may feel like you need to have a bowel movement. ¨ You may be coached to push with contractions. These contractions will be very strong, but you will not have them as often. You can get a little rest between contractions. ¨ You may be emotional and irritable. You may not be aware of what is going on around you. ¨ One last push, and your baby is born. · The third stage is when a few more contractions push out the placenta. This may take 30 minutes or less. · The fourth stage is the welcome recovery. You may feel overwhelmed with emotions and exhausted but alert. This is a good time to start breastfeeding. Where can you learn more? Go to http://ryne-marvin.info/. Enter Q915 in the search box to learn more about \"Weeks 34 to 36 of Your Pregnancy: Care Instructions. \" Current as of: May 30, 2016 Content Version: 11.1 © 2200-8469 AW-Energy, hike. Care instructions adapted under license by [employer] (which disclaims liability or warranty for this information). If you have questions about a medical condition or this instruction, always ask your healthcare professional. [address] any warranty or liability for your use of this information. Introducing [facility] & HEALTH SERVICES! Dear Mayco Celis: Thank you for requesting a YumDots account. Our records indicate that you already have an active YumDots account. You can access your account anytime at [email or web address]. [email or web address]/Data [email or web address] International Did you know that you can access your hospital and ER discharge instructions at any time in YumDots? You can also review all of your test results from your hospital stay or ER visit. Additional Information If you have questions, please visit the Frequently Asked Questions section of the YumDots website at [email or web address] Remember, YumDots is NOT to be used for urgent needs. For medical emergencies, dial 911. Now available from your iPhone and Android! Please provide this summary of care documentation to your next provider. Your primary care clinician is listed as Arelis Urrutia. If you have any questions after today's visit, please call [phone].

## 2017-02-16 NOTE — PROGRESS NOTES
[address] [facility]  [email or web address]  [phone]    Gage Wilson MD, FACOG       [facility]   FOLLOW UP OB NOTE WITH ULTRASOUND    Chief Complaint   Patient presents with    Routine Prenatal Visit     Vitals:    17 1523   BP: 108/64   Weight: 237 lb (107.5 kg)   Height: 5' 8\" (1.727 m)       The patient reports the following concerns: none  See PN flowsheet for exam    33 y.o.  34w5d   Encounter Diagnosis   Name Primary?  Prenatal care of multigravida, antepartum Yes       Disc rba of LGA, disc risk of shoulder dystocia and elective cs  Disc risk of fetal or maternal injury: temporary or permanent  Plan expectant management or IOL if favorable    I discussed with the patient the limitations of ultrasound and that imaging can not rule out all birth defects, chromosomal problems, or other problems with the baby. [] SAB/bleeding precautions reviewed   [] PTL/PPROM precautions reviewed   [] Labor precautions reviewed   [] Fetal kick counts discussed   [] Labs reviewed with patient   []     I discussed with the patient the limitations of ultrasound and that imaging can not rule out all birth defects, chromosomal problems, or other problems with the baby. Follow-up Disposition:  Return in about 2 weeks (around 3/2/2017) for Follow up OB visit. No orders of the defined types were placed in this encounter. Gage Wilson MD        Physician review of ultrasound performed by technician    Today's ultrasound report and images were reviewed and discussed with the patient. Please see images and imaging report entered by technician in PACS for more detail and progress note and diagnosis entered by MD.    Juan Luis Lopez MD    LIMITED OB SCAN  A SINGLE VERTEX 34W5D IUP IS SEEN. FETAL CARDIAC MOTION OBSERVED. LIMITED ANATOMY WAS VISUALIZED AND APPEARS WNL. FETUS APPEARS LGA. SIZE > DATES. FETUS > 90%. MALINI AND PLACENTA APPEAR WNL.

## 2017-02-22 ENCOUNTER — ROUTINE PRENATAL (OUTPATIENT)
Dept: OBGYN CLINIC | Age: 32
End: 2017-02-22

## 2017-02-22 VITALS
HEIGHT: 68 IN | BODY MASS INDEX: 36.07 KG/M2 | DIASTOLIC BLOOD PRESSURE: 78 MMHG | SYSTOLIC BLOOD PRESSURE: 122 MMHG | WEIGHT: 238 LBS

## 2017-02-22 DIAGNOSIS — Z34.80 PRENATAL CARE OF MULTIGRAVIDA, ANTEPARTUM: Primary | ICD-10-CM

## 2017-02-22 NOTE — PROGRESS NOTES
[facility]  [email or web address]  [phone]    Anay Peacock MD, FACOG     Follow-up OB visit    Chief Complaint   Patient presents with    Routine Prenatal Visit       Vitals:    17 1106   BP: 122/78   Weight: 238 lb (108 kg)   Height: 5' 8\" (1.727 m)       Patient Active Problem List    Diagnosis Date Noted    Varicose vein of leg 2016    Pregnancy 2016    Abdominal pain in pregnancy 2013       The patient reports the following concerns: Patient reports having upper rib pain on her left side, patient states that she cannot even lay on her side anymore she has to sleep sitting up. Patient reports a sharp shooting pain. Flu vaccine: received this season  Tdap: complete    See PN flowsheet for exam    33 y.o.  35w4d   Encounter Diagnosis   Name Primary?  Prenatal care of multigravida, antepartum Yes        [] SAB/bleeding precautions reviewed   [] PTL/PPROM precautions reviewed   [] Labor precautions reviewed   [] Fetal kick counts discussed   [] Labs reviewed with patient   [] Natalia Abiel precautions reviewed   [] Consent reviewed   [] Handouts given to pt   [] Glucola handout    [] GBS/labor/Magic Hour handout   []    []    []    []    Follow-up Disposition:  Return in about 1 week (around 3/1/2017) for Follow up OB visit.     Orders Placed This Encounter   Macario Quinones MD

## 2017-02-22 NOTE — PATIENT INSTRUCTIONS

## 2017-02-22 NOTE — MR AVS SNAPSHOT
Visit Information Date & Time Provider Department Dept. Phone Encounter #  
 2/22/2017 10:50 AM Arya Cantu MD [employer] [phone] [id number] Follow-up Instructions Return in about 1 week (around 3/1/2017) for Follow up OB visit. Your Appointments 3/1/2017 11:10 AM  
OB VISIT with Arya Cantu MD  
[employer] ([facility]) Appt Note: 37wk fob   TP  
 [address] [place] [address]  
[phone]  
  
   
 [address] [place] [address]  
  
    
 3/8/2017  3:10 PM  
OB VISIT with Arya Cantu MD  
[employer] ([facility]) Appt Note: 38wk fob  TP  
 [address] [place] [address]  
[phone] Upcoming Health Maintenance Date Due  
 PAP AKA CERVICAL CYTOLOGY 8/18/2019 Allergies as of 2/22/2017  Review Complete On: 2/22/2017 By: Radha Rios, LPN Severity Noted Reaction Type Reactions Penicillins  05/12/2013    Rash, Nausea and Vomiting  
 Sulfa (Sulfonamide Antibiotics)  05/12/2013    Hives Current Immunizations  Reviewed on 2/6/2017 Name Date Influenza Vaccine (Quad) PF 10/11/2016 Tdap 2/16/2017, 5/14/2013 12:35 PM  
  
 Not reviewed this visit You Were Diagnosed With   
  
 Codes Comments Prenatal care of multigravida, antepartum    -  Primary ICD-10-CM: Z34.80 ICD-9-CM: V22.1 Vitals BP  
  
  
  
  
  
 122/78 BMI and BSA Data Body Mass Index Body Surface Area  
 36.19 kg/m 2 2.28 m 2 Preferred Pharmacy Pharmacy Name Phone [id number] [address] [facility], [address] [place] [address]  [phone] Your Updated Medication List  
  
   
This list is accurate as of: 2/22/17 11:08 AM.  Always use your most recent med list.  
  
  
  
  
 calcium carbonate 400 mg Prestonon Hammannie Commonly known as:  MYLANTA Take 1 Tab by mouth three (3) times daily (with meals). PRENATAL 19 (WITH DOCUSATE) 29-1 mg Tab Generic drug:  prenatal vit-fe fum-fa-dss Take  by mouth. ROBITUSSIN PE PO Take  by mouth. SUDAFED PO Take  by mouth. TYLENOL 325 mg tablet Generic drug:  acetaminophen Take  by mouth every four (4) hours as needed for Pain. Follow-up Instructions Return in about 1 week (around 3/1/2017) for Follow up OB visit. Patient Instructions Weeks 34 to 36 of Your Pregnancy: Care Instructions Your Care Instructions By now, your baby and your belly have grown quite large. It is almost time to give birth. A full-term pregnancy can deliver between 37 and 42 weeks. Your baby's lungs are almost ready to breathe air. The bones in your baby's head are now firm enough to protect it, but soft enough to move down through the birth canal. 
You may feel excited, happy, anxious, or scared. You may wonder how you will know if you are in labor or what to expect during labor. Try to be flexible in your expectations of the birth. Because each birth is different, there is no way to know exactly what childbirth will be like for you. This care sheet will help you know what to expect and how to prepare. This may make your childbirth easier. If you haven't already had the Tdap shot during this pregnancy, talk to your doctor about getting it. It will help protect your  against pertussis infection. In the 36th week, most women have a test for group B streptococcus (GBS). GBS is a common bacteria that can live in the vagina and rectum. It can make your baby sick after birth. If you test positive, you will get antibiotics during labor. The medicine will keep your baby from getting the bacteria. Follow-up care is a key part of your treatment and safety.  Be sure to make and go to all appointments, and call your doctor if you are having problems. It's also a good idea to know your test results and keep a list of the medicines you take. How can you care for yourself at home? Learn about pain relief choices · Pain is different for every woman. Talk with your doctor about your feelings about pain. · You can choose from several types of pain relief. These include medicine or breathing techniques, as well as comfort measures. You can use more than one option. · If you choose to have pain medicine during labor, talk to your doctor about your options. Some medicines lower anxiety and help with some of the pain. Others make your lower body numb so that you won't feel pain. · Be sure to tell your doctor about your pain medicine choice before you start labor or very early in your labor. You may be able to change your mind as labor progresses. · Rarely, a woman is put to sleep by medicine given through a mask or an IV. Labor and delivery · The first stage of labor has three parts: early, active, and transition. ¨ Most women have early labor at home. You can stay busy or rest, eat light snacks, drink clear fluids, and start counting contractions. ¨ When talking during a contraction gets hard, you may be moving to active labor. During active labor, you should head for the hospital if you are not there already. ¨ You are in active labor when contractions come every 3 to 4 minutes and last about 60 seconds. Your cervix is opening more rapidly. ¨ If your water breaks, contractions will come faster and stronger. ¨ During transition, your cervix is stretching, and contractions are coming more rapidly. ¨ You may want to push, but your cervix might not be ready. Your doctor will tell you when to push. · The second stage starts when your cervix is completely opened and you are ready to push. ¨ Contractions are very strong to push the baby down the birth canal. 
¨ You will feel the urge to push. You may feel like you need to have a bowel movement. ¨ You may be coached to push with contractions. These contractions will be very strong, but you will not have them as often. You can get a little rest between contractions. ¨ You may be emotional and irritable. You may not be aware of what is going on around you. ¨ One last push, and your baby is born. · The third stage is when a few more contractions push out the placenta. This may take 30 minutes or less. · The fourth stage is the welcome recovery. You may feel overwhelmed with emotions and exhausted but alert. This is a good time to start breastfeeding. Where can you learn more? Go to http://ryne-marvin.info/. Enter O847 in the search box to learn more about \"Weeks 34 to 36 of Your Pregnancy: Care Instructions. \" Current as of: May 30, 2016 Content Version: 11.1 © 6642-4347 [employer]. Care instructions adapted under license by [employer] (which disclaims liability or warranty for this information). If you have questions about a medical condition or this instruction, always ask your healthcare professional. [place] [address] any warranty or liability for your use of this information. Introducing [facility] & HEALTH SERVICES! Dear Dalia Culp: Thank you for requesting a 8[employer] Story account. Our records indicate that you already have an active 8th Story account. You can access your account anytime at [email or web address]. [email or web address] Did you know that you can access your hospital and ER discharge instructions at any time in 8th Story? You can also review all of your test results from your hospital stay or ER visit. Additional Information If you have questions, please visit the Frequently Asked Questions section of the 8th Story website at [email or web address]/. Remember, 8th Story is NOT to be used for urgent needs. For medical emergencies, dial 911. Now available from your iPhone and Android! Please provide this summary of care documentation to your next provider. Your primary care clinician is listed as Jared Valdez. If you have any questions after today's visit, please call [phone].

## 2017-02-24 LAB
GP B STREP DNA SPEC QL NAA+PROBE: NEGATIVE
GRBS, EXTERNAL: NEGATIVE

## 2017-03-01 ENCOUNTER — ROUTINE PRENATAL (OUTPATIENT)
Dept: OBGYN CLINIC | Age: 32
End: 2017-03-01

## 2017-03-01 VITALS
SYSTOLIC BLOOD PRESSURE: 110 MMHG | DIASTOLIC BLOOD PRESSURE: 72 MMHG | HEIGHT: 68 IN | BODY MASS INDEX: 36.68 KG/M2 | WEIGHT: 242 LBS

## 2017-03-01 DIAGNOSIS — Z3A.36 36 WEEKS GESTATION OF PREGNANCY: ICD-10-CM

## 2017-03-01 DIAGNOSIS — R07.81 RIB PAIN: Primary | ICD-10-CM

## 2017-03-01 DIAGNOSIS — Z34.80 PRENATAL CARE OF MULTIGRAVIDA, ANTEPARTUM: ICD-10-CM

## 2017-03-01 RX ORDER — ACETAMINOPHEN AND CODEINE PHOSPHATE 300; 30 MG/1; MG/1
1 TABLET ORAL
Qty: 20 TAB | Refills: 0 | Status: SHIPPED | OUTPATIENT
Start: 2017-03-01 | End: 2017-03-23

## 2017-03-01 NOTE — PATIENT INSTRUCTIONS

## 2017-03-01 NOTE — PROGRESS NOTES
[facility]  [email or web address]  [phone]    Tigist Jameson MD, FACOG     Follow-up OB visit    Chief Complaint   Patient presents with    Routine Prenatal Visit       Vitals:    17 1130   BP: 110/72   Weight: 242 lb (109.8 kg)   Height: 5' 8\" (1.727 m)       Patient Active Problem List    Diagnosis Date Noted    Prenatal care of multigravida, antepartum 2017    Varicose vein of leg 2016    Pregnancy 2016    Abdominal pain in pregnancy 2013       The patient reports the following concerns: none    Flu vaccine: received this season  Tdap: complete    See PN flowsheet for exam    33 y.o.  36w4d   Encounter Diagnoses   Name Primary?  Rib pain Yes    36 weeks gestation of pregnancy     Prenatal care of multigravida, antepartum      Pain prec, disc risks of pain meds/narcotics in IUP  Rec pcp fu if NI, worsening sx. Consider supportive wrap     [] SAB/bleeding precautions reviewed   [] PTL/PPROM precautions reviewed   [x] Labor precautions reviewed   [x] Fetal kick counts discussed   [] Labs reviewed with patient   [] Simonne Gavin precautions reviewed   [] Consent reviewed   [] Handouts given to pt   [] Glucola handout    [] GBS/labor/Magic Hour handout   []    []    []    []    Follow-up Disposition:  Return in about 1 week (around 3/8/2017) for Follow up OB visit.     Orders Placed This Encounter    acetaminophen-codeine (TYLENOL-CODEINE #3) 300-30 mg per tablet       Tigist Jameson MD

## 2017-03-01 NOTE — MR AVS SNAPSHOT
Visit Information Date & Time Provider Department Dept. Phone Encounter #  
 3/1/2017 11:10 AM MD Harsha Bella [phone] [id number] Your Appointments 3/8/2017  3:10 PM  
OB VISIT with MD [facility] Appt Note: 38wk fob  TP  
 [address] [facility] [id number]  
[facility] [address] Upcoming Health Maintenance Date Due  
 PAP AKA CERVICAL CYTOLOGY 8/18/2019 Allergies as of 3/1/2017  Review Complete On: 2/22/2017 By: Carlos Manuel Pineda MD  
  
 Severity Noted Reaction Type Reactions Penicillins  05/12/2013    Rash, Nausea and Vomiting  
 Sulfa (Sulfonamide Antibiotics)  05/12/2013    Hives Current Immunizations  Reviewed on 2/6/2017 Name Date Influenza Vaccine (Quad) PF 10/11/2016 Tdap 2/16/2017, 5/14/2013 12:35 PM  
  
 Not reviewed this visit Vitals BP  
  
  
  
  
  
 110/72 BMI and BSA Data Body Mass Index Body Surface Area  
 36.8 kg/m 2 2.3 m 2 Preferred Pharmacy Pharmacy Name Phone [id number] N[facility]av [facility], [address] [place] [address]  [phone] Your Updated Medication List  
  
   
This list is accurate as of: 3/1/17 11:31 AM.  Always use your most recent med list.  
  
  
  
  
 calcium carbonate 400 mg Chew Commonly known as:  MYLANTA Take 1 Tab by mouth three (3) times daily (with meals). PRENATAL 19 (WITH DOCUSATE) 29-1 mg Tab Generic drug:  prenatal vit-fe fum-fa-dss Take  by mouth. ROBITUSSIN PE PO Take  by mouth. SUDAFED PO Take  by mouth. TYLENOL 325 mg tablet Generic drug:  acetaminophen Take  by mouth every four (4) hours as needed for Pain. Patient Instructions Weeks 34 to 36 of Your Pregnancy: Care Instructions Your Care Instructions By now, your baby and your belly have grown quite large. It is almost time to give birth. A full-term pregnancy can deliver between 37 and 42 weeks. Your baby's lungs are almost ready to breathe air. The bones in your baby's head are now firm enough to protect it, but soft enough to move down through the birth canal. 
You may feel excited, happy, anxious, or scared. You may wonder how you will know if you are in labor or what to expect during labor. Try to be flexible in your expectations of the birth. Because each birth is different, there is no way to know exactly what childbirth will be like for you. This care sheet will help you know what to expect and how to prepare. This may make your childbirth easier. If you haven't already had the Tdap shot during this pregnancy, talk to your doctor about getting it. It will help protect your  against pertussis infection. In the 36th week, most women have a test for group B streptococcus (GBS). GBS is a common bacteria that can live in the vagina and rectum. It can make your baby sick after birth. If you test positive, you will get antibiotics during labor. The medicine will keep your baby from getting the bacteria. Follow-up care is a key part of your treatment and safety. Be sure to make and go to all appointments, and call your doctor if you are having problems. It's also a good idea to know your test results and keep a list of the medicines you take. How can you care for yourself at home? Learn about pain relief choices · Pain is different for every woman. Talk with your doctor about your feelings about pain. · You can choose from several types of pain relief. These include medicine or breathing techniques, as well as comfort measures. You can use more than one option. · If you choose to have pain medicine during labor, talk to your doctor about your options.  Some medicines lower anxiety and help with some of the pain. Others make your lower body numb so that you won't feel pain. · Be sure to tell your doctor about your pain medicine choice before you start labor or very early in your labor. You may be able to change your mind as labor progresses. · Rarely, a woman is put to sleep by medicine given through a mask or an IV. Labor and delivery · The first stage of labor has three parts: early, active, and transition. ¨ Most women have early labor at home. You can stay busy or rest, eat light snacks, drink clear fluids, and start counting contractions. ¨ When talking during a contraction gets hard, you may be moving to active labor. During active labor, you should head for the hospital if you are not there already. ¨ You are in active labor when contractions come every 3 to 4 minutes and last about 60 seconds. Your cervix is opening more rapidly. ¨ If your water breaks, contractions will come faster and stronger. ¨ During transition, your cervix is stretching, and contractions are coming more rapidly. ¨ You may want to push, but your cervix might not be ready. Your doctor will tell you when to push. · The second stage starts when your cervix is completely opened and you are ready to push. ¨ Contractions are very strong to push the baby down the birth canal. 
¨ You will feel the urge to push. You may feel like you need to have a bowel movement. ¨ You may be coached to push with contractions. These contractions will be very strong, but you will not have them as often. You can get a little rest between contractions. ¨ You may be emotional and irritable. You may not be aware of what is going on around you. ¨ One last push, and your baby is born. · The third stage is when a few more contractions push out the placenta. This may take 30 minutes or less. · The fourth stage is the welcome recovery. You may feel overwhelmed with emotions and exhausted but alert. This is a good time to start breastfeeding. Where can you learn more? Go to http://ryne-marvin.info/. Enter K432 in the search box to learn more about \"Weeks 34 to 36 of Your Pregnancy: Care Instructions. \" Current as of: May 30, 2016 Content Version: 11.1 © 7229-2266 [employer]. Care instructions adapted under license by [employer] (which disclaims liability or warranty for this information). If you have questions about a medical condition or this instruction, always ask your healthcare professional. [address] any warranty or liability for your use of this information. Introducing [facility] & HEALTH SERVICES! Dear Mayco Celis: Thank you for requesting a MarketInvoice account. Our records indicate that you already have an active MarketInvoice account. You can access your account anytime at [email or web address]. Insyde Software/CAPE Technologies Did you know that you can access your hospital and ER discharge instructions at any time in MarketInvoice? You can also review all of your test results from your hospital stay or ER visit. Additional Information If you have questions, please visit the Frequently Asked Questions section of the MarketInvoice website at [email or web address] Insyde [email or web address]/[email or web address]/. Remember, MarketInvoice is NOT to be used for urgent needs. For medical emergencies, dial 911. Now available from your iPhone and Android! Please provide this summary of care documentation to your next provider. Your primary care clinician is listed as Arelis Urrutia. If you have any questions after today's visit, please call [phone].

## 2017-03-07 ENCOUNTER — ROUTINE PRENATAL (OUTPATIENT)
Dept: OBGYN CLINIC | Age: 32
End: 2017-03-07

## 2017-03-07 VITALS
BODY MASS INDEX: 36.98 KG/M2 | HEIGHT: 68 IN | SYSTOLIC BLOOD PRESSURE: 122 MMHG | WEIGHT: 244 LBS | DIASTOLIC BLOOD PRESSURE: 68 MMHG

## 2017-03-07 DIAGNOSIS — Z34.80 PRENATAL CARE OF MULTIGRAVIDA, ANTEPARTUM: Primary | ICD-10-CM

## 2017-03-07 NOTE — MR AVS SNAPSHOT
Visit Information Date & Time Provider Department Dept. Phone Encounter #  
 3/7/2017  3:00 PM [facility] [id number] Your Appointments 3/15/2017  4:10 PM  
OB VISIT with MD Harsha Jovel ([address]) Appt Note: 38wk fob TP  
 [address] [facility] [address]  
[phone]  
  
   
 [address] [place] [address]  
  
    
 3/22/2017  4:10 PM  
OB VISIT with MD Harsha Jovel ([address]) Appt Note: 39wk fob TP  
 [address] [place] [address]  
[phone] Upcoming Health Maintenance Date Due  
 PAP AKA CERVICAL CYTOLOGY 8/18/2019 Allergies as of 3/7/2017  Review Complete On: 3/7/2017 By: Latesha Carrillo LPN Severity Noted Reaction Type Reactions Penicillins  05/12/2013    Rash, Nausea and Vomiting  
 Sulfa (Sulfonamide Antibiotics)  05/12/2013    Hives Current Immunizations  Reviewed on 2/6/2017 Name Date Influenza Vaccine (Quad) PF 10/11/2016 Tdap 2/16/2017, 5/14/2013 12:35 PM  
  
 Not reviewed this visit Vitals Height(growth percentile) Weight(growth percentile) LMP BMI OB Status Smoking Status 5' 8\" (1.727 m) 244 lb (110.7 kg) 06/20/2016 (Exact Date) 37.1 kg/m2 Pregnant Never Smoker BMI and BSA Data Body Mass Index Body Surface Area  
 37.1 kg/m 2 2.3 m 2 Preferred Pharmacy [facility]acy Name Phone [id number] [address] [facility], [address] [place] [address]  [phone] Your Updated Medication List  
  
   
This list is accurate as of: 3/7/17  3:05 PM.  Always use your most recent med list.  
  
  
  
  
 acetaminophen-codeine 300-30 mg per tablet Commonly known as:  TYLENOL-CODEINE #3 Take 1 Tab by mouth every four (4) hours as needed for Pain. Max Daily Amount: 6 Tabs. calcium carbonate 400 mg Chew Commonly known as:  MYLANTA Take 1 Tab by mouth three (3) times daily (with meals). PRENATAL 19 (WITH DOCUSATE) 29-1 mg Tab Generic drug:  prenatal vit-fe fum-fa-dss Take  by mouth. ROBITUSSIN PE PO Take  by mouth. SUDAFED PO Take  by mouth. TYLENOL 325 mg tablet Generic drug:  acetaminophen Take  by mouth every four (4) hours as needed for Pain. Patient Instructions Week 37 of Your Pregnancy: Care Instructions Your Care Instructions You are near the end of your pregnancyand you're probably pretty uncomfortable. It may be harder to walk around. Lying down probably isn't comfortable either. You may have trouble getting to sleep or staying asleep. Most women deliver their babies between 40 and 41 weeks. This is a good time to think about packing a bag for the hospital with items you'll need. Then you'll be ready when labor starts. Follow-up care is a key part of your treatment and safety. Be sure to make and go to all appointments, and call your doctor if you are having problems. It's also a good idea to know your test results and keep a list of the medicines you take. How can you care for yourself at home? Learn about breastfeeding · Breastfeeding is best for your baby and good for you. · Breast milk has antibodies to help your baby fight infections. · Mothers who breastfeed often lose weight faster, because making milk burns calories. · Learning the best ways to hold your baby will make breastfeeding easier. · Let your partner bathe and diaper the baby to keep your partner from feeling left out. Snuggle together when you breastfeed. · You may want to learn how to use a breast pump and store your milk. · If you choose to bottle feed, make the feeding feel like breastfeeding so you can bond with your baby. Always hold your baby and the bottle. Do not prop bottles or let your baby fall asleep with a bottle. Learn about crying · It is common for babies to cry for 1 to 3 hours a day. Some cry more, some cry less. · Babies don't cry to make you upset or because you are a bad parent. · Crying is how your baby communicates. Your baby may be hungry; have gas; need a diaper change; or feel cold, warm, tired, lonely, or tense. Sometimes babies cry for unknown reasons. · If you respond to your baby's needs, he or she will learn to trust you. · Try to stay calm when your baby cries. Your baby may get more upset if he or she senses that you are upset. Know how to care for your  · Your baby's umbilical cord stump will drop off on its own, usually between 1 and 2 weeks. To care for your baby's umbilical cord area: ¨ Clean the area at the bottom of the cord 2 or 3 times a day. ¨ Pay special attention to the area where the cord attaches to the skin. ¨ Keep the diaper folded below the cord. ¨ Use a damp washcloth or cotton ball to sponge bathe your baby until the stump has come off. · Your baby's first dark stool is called meconium. After the meconium is passed, your baby will develop his or her own bowel pattern. ¨ Some babies, especially  babies, have several bowel movements a day. Others have one or two a day, or one every 2 to 3 days. ¨  babies often have loose, yellow stools. Formula-fed babies have more formed stools. ¨ If your baby's stools look like little pellets, he or she is constipated. After 2 days of constipation, call your baby's doctor. · If your baby will be circumcised, you can care for him at home. ¨ Gently rinse his penis with warm water after every diaper change. Do not try to remove the film that forms on the penis. This film will go away on its own. Pat dry. ¨ Put petroleum ointment, such as Vaseline, on the area of the diaper that will touch your baby's penis. This will keep the diaper from sticking to your baby. ¨ Ask the doctor about giving your baby acetaminophen (Tylenol) for pain. Where can you learn more? Go to http://ryne-marvin.info/. Enter 68 21 [id number] in the search box to learn more about \"Week 37 of Your Pregnancy: Care Instructions. \" Current as of: May 30, 2016 Content Version: 11.1 © 1563-7176 VideoClix. Care instructions adapted under license by [employer] (which disclaims liability or warranty for this information). If you have questions about a medical condition or this instruction, always ask your healthcare professional. [address] any warranty or liability for your use of this information. Introducing [facility] & HEALTH SERVICES! Dear Yola Burnett: Thank you for requesting a [address] account. Our records indicate that you already have an active [address] account. You can access your account anytime at [email or web address] Clothes Did you know that you can access your hospital and ER discharge instructions at any time in [address]? You can also review all of your test results from your hospital stay or ER visit. Additional Information If you have questions, please visit the Frequently Asked Questions section of the [address] website at [email or web address] Remember, [address] is NOT to be used for urgent needs. For medical emergencies, dial 911. Now available from your iPhone and Android! Please provide this summary of care documentation to your next provider. Your primary care clinician is listed as Yaima Gandara. If you have any questions after today's visit, please call [phone].

## 2017-03-07 NOTE — PROGRESS NOTES
[facility]  [email or web address]  [phone]    Anay Peacock MD, FACOG     Follow-up OB visit    Chief Complaint   Patient presents with    Routine Prenatal Visit       Vitals:    17 1504   BP: 122/68   Weight: 244 lb (110.7 kg)   Height: 5' 8\" (1.727 m)       Patient Active Problem List    Diagnosis Date Noted    Prenatal care of multigravida, antepartum 2017    Varicose vein of leg 2016    Pregnancy 2016    Abdominal pain in pregnancy 2013       The patient reports the following concerns: none    Flu vaccine: received this season  Tdap: complete    See PN flowsheet for exam    33 y.o.  37w3d   No diagnosis found. [] SAB/bleeding precautions reviewed   [] PTL/PPROM precautions reviewed   [] Labor precautions reviewed   [] Fetal kick counts discussed   [] Labs reviewed with patient   [] Natalia Abiel precautions reviewed   [] Consent reviewed   [] Handouts given to pt   [] Glucola handout    [] GBS/labor/Magic Hour handout   []    []    []    []    Follow-up Disposition: Not on File    No orders of the defined types were placed in this encounter.       Anay Peacock MD

## 2017-03-07 NOTE — PATIENT INSTRUCTIONS
Week 37 of Your Pregnancy: Care Instructions  Your Care Instructions    You are near the end of your pregnancy--and you're probably pretty uncomfortable. It may be harder to walk around. Lying down probably isn't comfortable either. You may have trouble getting to sleep or staying asleep. Most women deliver their babies between 40 and 41 weeks. This is a good time to think about packing a bag for the hospital with items you'll need. Then you'll be ready when labor starts. Follow-up care is a key part of your treatment and safety. Be sure to make and go to all appointments, and call your doctor if you are having problems. It's also a good idea to know your test results and keep a list of the medicines you take. How can you care for yourself at home? Learn about breastfeeding  · Breastfeeding is best for your baby and good for you. · Breast milk has antibodies to help your baby fight infections. · Mothers who breastfeed often lose weight faster, because making milk burns calories. · Learning the best ways to hold your baby will make breastfeeding easier. · Let your partner bathe and diaper the baby to keep your partner from feeling left out. Snuggle together when you breastfeed. · You may want to learn how to use a breast pump and store your milk. · If you choose to bottle feed, make the feeding feel like breastfeeding so you can bond with your baby. Always hold your baby and the bottle. Do not prop bottles or let your baby fall asleep with a bottle. Learn about crying  · It is common for babies to cry for 1 to 3 hours a day. Some cry more, some cry less. · Babies don't cry to make you upset or because you are a bad parent. · Crying is how your baby communicates. Your baby may be hungry; have gas; need a diaper change; or feel cold, warm, tired, lonely, or tense. Sometimes babies cry for unknown reasons. · If you respond to your baby's needs, he or she will learn to trust you.   · Try to stay calm when your baby cries. Your baby may get more upset if he or she senses that you are upset. Know how to care for your   · Your baby's umbilical cord stump will drop off on its own, usually between 1 and 2 weeks. To care for your baby's umbilical cord area:  ¨ Clean the area at the bottom of the cord 2 or 3 times a day. ¨ Pay special attention to the area where the cord attaches to the skin. ¨ Keep the diaper folded below the cord. ¨ Use a damp washcloth or cotton ball to sponge bathe your baby until the stump has come off. · Your baby's first dark stool is called meconium. After the meconium is passed, your baby will develop his or her own bowel pattern. ¨ Some babies, especially  babies, have several bowel movements a day. Others have one or two a day, or one every 2 to 3 days. ¨  babies often have loose, yellow stools. Formula-fed babies have more formed stools. ¨ If your baby's stools look like little pellets, he or she is constipated. After 2 days of constipation, call your baby's doctor. · If your baby will be circumcised, you can care for him at home. ¨ Gently rinse his penis with warm water after every diaper change. Do not try to remove the film that forms on the penis. This film will go away on its own. Pat dry. ¨ Put petroleum ointment, such as Vaseline, on the area of the diaper that will touch your baby's penis. This will keep the diaper from sticking to your baby. ¨ Ask the doctor about giving your baby acetaminophen (Tylenol) for pain. Where can you learn more? Go to http://ryne-marvin.info/. Enter 68 21 97 in the search box to learn more about \"Week 37 of Your Pregnancy: Care Instructions. \"  Current as of: May 30, 2016  Content Version: 11.1  © 9369-5207 [employer]. Care instructions adapted under license by [employer] (which disclaims liability or warranty for this information).  If you have questions about a medical condition or this instruction, always ask your healthcare professional. [place] [address] any warranty or liability for your use of this information.

## 2017-03-09 ENCOUNTER — TELEPHONE (OUTPATIENT)
Dept: OBGYN CLINIC | Age: 32
End: 2017-03-09

## 2017-03-09 NOTE — TELEPHONE ENCOUNTER
33year old  37w5d pregnant patient calling c/o having regular contractions last night that resolved and again this morning that resolved. Since then she has had some nausea and cramping. Patient denies ROM, vaginal bleeding and regular contractions. Patient has +FM. 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 Advised patient if NI in symptoms or if ROM, regular contractions every 3-5 minutes, vaginal bleeding and/or -FM occurs to call back to office. Patient verbalized understanding.

## 2017-03-10 ENCOUNTER — ROUTINE PRENATAL (OUTPATIENT)
Dept: OBGYN CLINIC | Age: 32
End: 2017-03-10

## 2017-03-10 VITALS — BODY MASS INDEX: 36.8 KG/M2 | DIASTOLIC BLOOD PRESSURE: 72 MMHG | WEIGHT: 242 LBS | SYSTOLIC BLOOD PRESSURE: 116 MMHG

## 2017-03-10 DIAGNOSIS — O47.9 BRAXTON HICKS CONTRACTIONS: ICD-10-CM

## 2017-03-10 DIAGNOSIS — Z3A.37 37 WEEKS GESTATION OF PREGNANCY: Primary | ICD-10-CM

## 2017-03-10 NOTE — PATIENT INSTRUCTIONS
Week 37 of Your Pregnancy: Care Instructions  Your Care Instructions    You are near the end of your pregnancy--and you're probably pretty uncomfortable. It may be harder to walk around. Lying down probably isn't comfortable either. You may have trouble getting to sleep or staying asleep. Most women deliver their babies between 40 and 41 weeks. This is a good time to think about packing a bag for the hospital with items you'll need. Then you'll be ready when labor starts. Follow-up care is a key part of your treatment and safety. Be sure to make and go to all appointments, and call your doctor if you are having problems. It's also a good idea to know your test results and keep a list of the medicines you take. How can you care for yourself at home? Learn about breastfeeding  · Breastfeeding is best for your baby and good for you. · Breast milk has antibodies to help your baby fight infections. · Mothers who breastfeed often lose weight faster, because making milk burns calories. · Learning the best ways to hold your baby will make breastfeeding easier. · Let your partner bathe and diaper the baby to keep your partner from feeling left out. Snuggle together when you breastfeed. · You may want to learn how to use a breast pump and store your milk. · If you choose to bottle feed, make the feeding feel like breastfeeding so you can bond with your baby. Always hold your baby and the bottle. Do not prop bottles or let your baby fall asleep with a bottle. Learn about crying  · It is common for babies to cry for 1 to 3 hours a day. Some cry more, some cry less. · Babies don't cry to make you upset or because you are a bad parent. · Crying is how your baby communicates. Your baby may be hungry; have gas; need a diaper change; or feel cold, warm, tired, lonely, or tense. Sometimes babies cry for unknown reasons. · If you respond to your baby's needs, he or she will learn to trust you.   · Try to stay calm when your baby cries. Your baby may get more upset if he or she senses that you are upset. Know how to care for your   · Your baby's umbilical cord stump will drop off on its own, usually between 1 and 2 weeks. To care for your baby's umbilical cord area:  ¨ Clean the area at the bottom of the cord 2 or 3 times a day. ¨ Pay special attention to the area where the cord attaches to the skin. ¨ Keep the diaper folded below the cord. ¨ Use a damp washcloth or cotton ball to sponge bathe your baby until the stump has come off. · Your baby's first dark stool is called meconium. After the meconium is passed, your baby will develop his or her own bowel pattern. ¨ Some babies, especially  babies, have several bowel movements a day. Others have one or two a day, or one every 2 to 3 days. ¨  babies often have loose, yellow stools. Formula-fed babies have more formed stools. ¨ If your baby's stools look like little pellets, he or she is constipated. After 2 days of constipation, call your baby's doctor. · If your baby will be circumcised, you can care for him at home. ¨ Gently rinse his penis with warm water after every diaper change. Do not try to remove the film that forms on the penis. This film will go away on its own. Pat dry. ¨ Put petroleum ointment, such as Vaseline, on the area of the diaper that will touch your baby's penis. This will keep the diaper from sticking to your baby. ¨ Ask the doctor about giving your baby acetaminophen (Tylenol) for pain. Where can you learn more? Go to http://ryne-marvin.info/. Enter 68 21 97 in the search box to learn more about \"Week 37 of Your Pregnancy: Care Instructions. \"  Current as of: May 30, 2016  Content Version: 11.1  © 0989-6453 ChatL[employer]. Care instructions adapted under license by [employer] (which disclaims liability or warranty for this information).  If you have questions about a medical condition or this instruction, always ask your healthcare professional. [place] [address] any warranty or liability for your use of this information.

## 2017-03-10 NOTE — PROGRESS NOTES
Pregnancy Problem    Kia Bourne is a 33 y.o.  at 37w6d presenting due to ongoing cramping/contractions x 48 hrs. Pt reports contractions are most frequent in the evenings, and then peter out. Pt still with mild contractions and pelvic pressure this morning. Denies LOF or VB. +FM. Mild tingling in right hand at night. Otherwise doing well. Ob/Gyn Hx:   - TSVD x 1, TT [id number]      Past Medical History:   Diagnosis Date    Pap smear for cervical cancer screening 2016    negative, HPV negative    Routine Papanicolaou smear 10/1/12    Negative (no hpv)       Past Surgical History:   Procedure Laterality Date    HX OTHER SURGICAL      Magomia lower left leg '98 '00       Family History   Problem Relation Age of Onset    No Known Problems Mother     Parkinson's Disease Father     No Known Problems Other        Social History     Social History    Marital status:      Spouse name: N/A    Number of children: N/A    Years of education: N/A     Occupational History    Not on file. Social History Main Topics    Smoking status: Never Smoker    Smokeless tobacco: Never Used    Alcohol use No    Drug use: No    Sexual activity: Yes     Partners: Male     Birth control/ protection: None     Other Topics Concern    Not on file     Social History Narrative       Current Outpatient Prescriptions   Medication Sig Dispense Refill    acetaminophen-codeine (TYLENOL-CODEINE #3) 300-30 mg per tablet Take 1 Tab by mouth every four (4) hours as needed for Pain. Max Daily Amount: 6 Tabs. 20 Tab 0    acetaminophen (TYLENOL) 325 mg tablet Take  by mouth every four (4) hours as needed for Pain.  PSEUDOEPHEDRINE HCL (SUDAFED PO) Take  by mouth.  GUAIFENESIN/PSEUDOEPHEDRNE HCL (ROBITUSSIN PE PO) Take  by mouth.  prenatal vit-fe fum-fa-dss (PRENATAL 19) 29-1 mg Tab Take  by mouth.  calcium carbonate (MYLANTA) 400 mg Chew Take 1 Tab by mouth three (3) times daily (with meals). Allergies   Allergen Reactions    Penicillins Rash and Nausea and Vomiting    Sulfa (Sulfonamide Antibiotics) Hives       Review of Systems - History obtained from the patient  Constitutional: negative for weight loss, fever, night sweats  HEENT: negative for hearing loss, earache, congestion, snoring, sorethroat  CV: negative for chest pain, palpitations, edema  Resp: negative for cough, shortness of breath, wheezing  GI: negative for change in bowel habits, abdominal pain, black or bloody stools  : negative for frequency, dysuria, hematuria, vaginal discharge, +uterine contractions  MSK: negative for back pain, joint pain, muscle pain  Breast: negative for breast lumps, nipple discharge, galactorrhea  Skin :negative for itching, rash, hives  Neuro: negative for dizziness, headache, confusion, weakness  Psych: negative for anxiety, depression, change in mood  Heme/lymph: negative for bleeding, bruising, pallor    Physical Exam    Visit Vitals    /72    Wt 242 lb (109.8 kg)    LMP 2016 (Exact Date)    BMI 36.8 kg/m2       Constitutional  · Appearance: well-nourished, well developed, alert, in no acute distress, obese    HENT  · Head and Face: appears normal    Chest  · Respiratory Effort: non-labored breathing    Cardiovascular  · Extremities: trace BLE peripheral edema    Gastrointestinal  · Abdominal Examination: abdomen gravid, obese, non-tender, +FHTs 351C, cephalic by leopolds, EFW 8-8.5lb    Genitourinary  · Cervix: /3, soft, mid-position  ·   Skin  · General Inspection: no rash, no lesions identified    Neurologic/Psychiatric  · Mental Status:  · Orientation: grossly oriented to person, place and time  · Mood and Affect: mood normal, affect appropriate      Assessment/Plan:  33 y.o.  at 37w6d with [facility] vs. early latent labor.  Cervix remains 1cm at this time.    -encouraged good PO hydration, tylenol prn discomfort  -labor precautions and FKCs reviewed  -work note for today only  -f/u with Dr. Jenny Mandujano as scheduled next Wed if not delivered    Carolin Pena MD  3/10/2017  8:41 AM

## 2017-03-10 NOTE — MR AVS SNAPSHOT
Visit Information Date & Time Provider Department Dept. Phone Encounter #  
 3/10/2017  8:20 AM [facility] [phone] [id number] Your Appointments 3/15/2017  4:10 PM  
OB VISIT with MD Harsha Alford ([address]) Appt Note: 38wk fob TP  
 [address] [facility] [address]  
[phone]  
  
   
 [address]  
  
    
 3/22/2017  4:10 PM  
OB VISIT with MD Harsha Alford ([address]) Appt Note: 39wk fob TP  
 [address] [place] [address]  
[phone]  
  
    
 3/28/2017  2:40 PM  
OB VISIT with MD Harsha Alford ([address]) Appt Note: marquez [address] [place] [address]  
[phone]  
  
    
 3/29/2017  7:00 AM  
PROCEDURE with MD Harsha Alford ([address]) Appt Note: Induction (marquez) [address] [facility] [phone]  
[facility] 31 [address] Upcoming Health Maintenance Date Due  
 PAP AKA CERVICAL CYTOLOGY 8/18/2019 Allergies as of 3/10/2017  Review Complete On: 3/10/2017 By: Charles Riley RN Severity Noted Reaction Type Reactions Penicillins  05/12/2013    Rash, Nausea and Vomiting  
 Sulfa (Sulfonamide Antibiotics)  05/12/2013    Hives Current Immunizations  Reviewed on 2/6/2017 Name Date Influenza Vaccine (Quad) PF 10/11/2016 Tdap 2/16/2017, 5/14/2013 12:35 PM  
  
 Not reviewed this visit Vitals BP Weight(growth percentile) LMP BMI OB Status Smoking Status 116/72 242 lb (109.8 kg) 06/20/2016 (Exact Date) 36.8 kg/m2 Pregnant Never Smoker BMI and BSA Data  Body Mass Index Body Surface Area  
 36.8 kg/m 2 2.3 m 2  
  
  
 Preferred Pharmacy Pharmacy Name Phone [id number] [phone], [address] [facility]  [phone] Your Updated Medication List  
  
   
This list is accurate as of: 3/10/17  8:32 AM.  Always use your most recent med list.  
  
  
  
  
 acetaminophen-codeine 300-30 mg per tablet Commonly known as:  TYLENOL-CODEINE #3 Take 1 Tab by mouth every four (4) hours as needed for Pain. Max Daily Amount: 6 Tabs. calcium carbonate 400 mg Chew Commonly known as:  MYLANTA Take 1 Tab by mouth three (3) times daily (with meals). PRENATAL 19 (WITH DOCUSATE) 29-1 mg Tab Generic drug:  prenatal vit-fe fum-fa-dss Take  by mouth. ROBITUSSIN PE PO Take  by mouth. SUDAFED PO Take  by mouth. TYLENOL 325 mg tablet Generic drug:  acetaminophen Take  by mouth every four (4) hours as needed for Pain. Patient Instructions Week 37 of Your Pregnancy: Care Instructions Your Care Instructions You are near the end of your pregnancyand you're probably pretty uncomfortable. It may be harder to walk around. Lying down probably isn't comfortable either. You may have trouble getting to sleep or staying asleep. Most women deliver their babies between 40 and 41 weeks. This is a good time to think about packing a bag for the hospital with items you'll need. Then you'll be ready when labor starts. Follow-up care is a key part of your treatment and safety. Be sure to make and go to all appointments, and call your doctor if you are having problems. It's also a good idea to know your test results and keep a list of the medicines you take. How can you care for yourself at home? Learn about breastfeeding · Breastfeeding is best for your baby and good for you. · Breast milk has antibodies to help your baby fight infections. · Mothers who breastfeed often lose weight faster, because making milk burns calories. · Learning the best ways to hold your baby will make breastfeeding easier. · Let your partner bathe and diaper the baby to keep your partner from feeling left out. Snuggle together when you breastfeed. · You may want to learn how to use a breast pump and store your milk. · If you choose to bottle feed, make the feeding feel like breastfeeding so you can bond with your baby. Always hold your baby and the bottle. Do not prop bottles or let your baby fall asleep with a bottle. Learn about crying · It is common for babies to cry for 1 to 3 hours a day. Some cry more, some cry less. · Babies don't cry to make you upset or because you are a bad parent. · Crying is how your baby communicates. Your baby may be hungry; have gas; need a diaper change; or feel cold, warm, tired, lonely, or tense. Sometimes babies cry for unknown reasons. · If you respond to your baby's needs, he or she will learn to trust you. · Try to stay calm when your baby cries. Your baby may get more upset if he or she senses that you are upset. Know how to care for your  · Your baby's umbilical cord stump will drop off on its own, usually between 1 and 2 weeks. To care for your baby's umbilical cord area: ¨ Clean the area at the bottom of the cord 2 or 3 times a day. ¨ Pay special attention to the area where the cord attaches to the skin. ¨ Keep the diaper folded below the cord. ¨ Use a damp washcloth or cotton ball to sponge bathe your baby until the stump has come off. · Your baby's first dark stool is called meconium. After the meconium is passed, your baby will develop his or her own bowel pattern. ¨ Some babies, especially  babies, have several bowel movements a day. Others have one or two a day, or one every 2 to 3 days. ¨  babies often have loose, yellow stools. Formula-fed babies have more formed stools.  
¨ If your baby's stools look like little pellets, he or she is constipated. After 2 days of constipation, call your baby's doctor. · If your baby will be circumcised, you can care for him at home. ¨ Gently rinse his penis with warm water after every diaper change. Do not try to remove the film that forms on the penis. This film will go away on its own. Pat dry. ¨ Put petroleum ointment, such as Vaseline, on the area of the diaper that will touch your baby's penis. This will keep the diaper from sticking to your baby. ¨ Ask the doctor about giving your baby acetaminophen (Tylenol) for pain. Where can you learn more? Go to http://ryne-marvin.info/. Enter 68 21 97 in the search box to learn more about \"Week 37 of Your Pregnancy: Care Instructions. \" Current as of: May 30, 2016 Content Version: 11.1 © 3890-8132 Sedimap. Care instructions adapted under license by [employer] (which disclaims liability or warranty for this information). If you have questions about a medical condition or this instruction, always ask your healthcare professional. [place] [address] any warranty or liability for your use of this information. Introducing [facility]! Dear Minus Countess: Thank you for requesting a [employer] account. Our records indicate that you already have an active [employer] account. You can access your account anytime at [email or web address] [employer] Did you know that you can access your hospital and ER discharge instructions at any time in [employer]? You can also review all of your test results from your hospital stay or ER visit. Additional Information If you have questions, please visit the Frequently Asked Questions section of the [employer] website at [email or web address]/. Remember, [employer] is NOT to be used for urgent needs. For medical emergencies, dial 911. Now available from your iPhone and Android! Please provide this summary of care documentation to your next provider. Your primary care clinician is listed as Alex Patricio. If you have any questions after today's visit, please call [phone].

## 2017-03-10 NOTE — LETTER
3/10/2017 8:39 AM 
 
Ms. Kia Sullivan [phone] To Whom it may concern; 
 
     Juan Luis Almodovar is under my care for pregnancy. She will be able to return to work as of 03/13/17 , at which time she will be capable of working with no restrictions. Patient is unable to attend work today 03/10/17. If you have any questions/concerns please contact my office at [phone]. Sincerely, Gavin Monterroso MD

## 2017-03-11 ENCOUNTER — HOSPITAL ENCOUNTER (OUTPATIENT)
Age: 32
Setting detail: OBSERVATION
Discharge: HOME | End: 2017-03-11
Attending: OBSTETRICS & GYNECOLOGY | Admitting: OBSTETRICS & GYNECOLOGY
Payer: COMMERCIAL

## 2017-03-11 VITALS
WEIGHT: 242 LBS | HEIGHT: 68 IN | TEMPERATURE: 98.3 F | OXYGEN SATURATION: 99 % | SYSTOLIC BLOOD PRESSURE: 119 MMHG | DIASTOLIC BLOOD PRESSURE: 73 MMHG | BODY MASS INDEX: 36.68 KG/M2 | RESPIRATION RATE: 18 BRPM | HEART RATE: 102 BPM

## 2017-03-11 PROBLEM — Z34.90 PREGNANT AND NOT YET DELIVERED: Status: ACTIVE | Noted: 2017-03-11

## 2017-03-11 PROCEDURE — 99285 EMERGENCY DEPT VISIT HI MDM: CPT | Performed by: OBSTETRICS & GYNECOLOGY

## 2017-03-11 PROCEDURE — 65270000029 HC RM PRIVATE

## 2017-03-11 PROCEDURE — 59025 FETAL NON-STRESS TEST: CPT | Performed by: OBSTETRICS & GYNECOLOGY

## 2017-03-11 PROCEDURE — 99218 HC RM OBSERVATION: CPT

## 2017-03-11 NOTE — PROGRESS NOTES
Patient presents at 38 weeks with contractions all night. Cervix was 1-2 in the office yesterday and is unchanged this morning. Contractions every 4-6 minutes with reactive tracing. Will walk for 2 hours and recheck.

## 2017-03-11 NOTE — IP AVS SNAPSHOT
[address] [place] [address] 
[phone] Patient: Mercedes Mcdaniel MRN: [id number] You are allergic to the following Allergen Reactions Penicillins Rash Nausea and Vomiting  
    
 Sulfa (Sulfonamide Antibiotics) Hives Recent Documentation Height Weight BMI OB Status Smoking Status 1.727 m 109.8 kg 36.8 kg/m2 Pregnant Never Smoker Emergency Contacts Name Discharge Info Relation Home Work Mobile Michael Sullivan  Spouse [3] [phone] About your hospitalization You were admitted on:  March 11, 2017 You last received care in the:  [facility] 2 LABOR & DELIVERY You were discharged on:  March 11, 2017 Unit phone number:  [phone] Why you were hospitalized Your primary diagnosis was:  Not on File Your diagnoses also included:  Pregnant And Not Yet Delivered Providers Seen During Your Hospitalizations Provider Role Specialty Primary office phone Celestino Sawyer MD Attending Provider Obstetrics & Gynecology [phone] Your Primary Care Physician (PCP) Primary Care Physician Office Phone Office Fax [facility] [phone] Follow-up Information None Your Appointments Wednesday March 15, 2017  4:10 PM EDT  
OB VISIT with MD aHrsha Guido ([facility]) [address] [place] [address] [address] [place] [address]  
[phone] Wednesday March 22, 2017  4:10 PM EDT  
OB VISIT with MD Harsha Guido ([facility]) [address] [place] [address] [address] [place] [address]  
[phone] Tuesday March 28, 2017  2:40 PM EDT  
OB VISIT with MD Harsha Guido ([facility]) [address] [place] [address] [address] [place] [address]  
[phone] Wednesday March 29, 2017  7:00 AM EDT PROCEDURE with MD Harsha Loaiza Mando ([facility]) [address] [place] [address]  
[phone] Current Discharge Medication List  
  
ASK your doctor about these medications Dose & Instructions Dispensing Information Comments Morning Noon Evening Bedtime  
 acetaminophen-codeine 300-30 mg per tablet Commonly known as:  TYLENOL-CODEINE #3 Your next dose is: Today, Tomorrow Other:  _________ Dose:  1 Tab Take 1 Tab by mouth every four (4) hours as needed for Pain. Max Daily Amount: 6 Tabs. Quantity:  20 Tab Refills:  0  
     
   
   
   
  
 calcium carbonate 400 mg Chew Commonly known as:  MYLANTA Your next dose is: Today, Tomorrow Other:  _________ Dose:  1 Tab Take 1 Tab by mouth three (3) times daily (with meals). Refills:  0 PRENATAL 19 (WITH DOCUSATE) 29-1 mg Tab Generic drug:  prenatal vit-fe fum-fa-dss Your next dose is: Today, Tomorrow Other:  _________ Take  by mouth. Refills:  0  
     
   
   
   
  
 ROBITUSSIN PE PO Your next dose is: Today, Tomorrow Other:  _________ Take  by mouth. Refills:  0 SUDAFED PO Your next dose is: Today, Tomorrow Other:  _________ Take  by mouth. Refills:  0  
     
   
   
   
  
 TYLENOL 325 mg tablet Generic drug:  acetaminophen Your next dose is: Today, Tomorrow Other:  _________ Take  by mouth every four (4) hours as needed for Pain. Refills:  0 Discharge Instructions Week 38 of Your Pregnancy: Care Instructions Your Care Instructions Believe it or not, your baby is almost here.  You may have ideas about your baby's personality because of how much he or she moves. Or you may have noticed how he or she responds to sounds, warmth, cold, and light. You may even know what kind of music your baby likes. By now, you have a better idea of what to expect during delivery. You may have talked about your birth preferences with your doctor. But even if you want a vaginal birth, it is a good idea to learn about  births.  birth means that your baby is born through a cut (incision) in your lower belly. It is sometimes the best choice for the health of the baby and the mother. This care sheet can help you understand  births. It also gives you information about what to expect after your baby is born. And it helps you understand more about postpartum depression. Follow-up care is a key part of your treatment and safety. Be sure to make and go to all appointments, and call your doctor if you are having problems. It's also a good idea to know your test results and keep a list of the medicines you take. How can you care for yourself at home? Learn about  birth · Most C-sections are unplanned. They are done because of problems that occur during labor. These problems might include: 
¨ Labor that slows or stops. ¨ High blood pressure or other problems for the mother. ¨ Signs of distress in the baby. These signs may include a very fast or slow heart rate. · Although most mothers and babies do well after , it is major surgery. It has more risks than a vaginal delivery. · In some cases, a planned  may be safer than a vaginal delivery. This may be the case if: ¨ The mother has a health problem, such as a heart condition. ¨ The baby isn't in a head-down position for delivery. This is called a breech position. ¨ The uterus has scars from past surgeries. This could increase the chance of a tear in the uterus. ¨ There is a problem with the placenta. ¨ The mother has an infection, such as genital herpes, that could be spread to the baby. ¨ The mother is having twins or more. ¨ The baby weighs 9 to 10 pounds or more. · Because of the risks of , planned C-sections generally should be done only for medical reasons. And a planned  should be done at 39 weeks or later unless there is a medical reason to do it sooner. Know what to expect after delivery, and plan for the first few weeks at home · You, your baby, and your partner or  will get identification bands. Only people with matching bands can  the baby from the nursery. · You will learn how to feed, diaper, and bathe your baby. And you will learn how to care for the umbilical cord stump. If your baby will be circumcised, you will also learn how to care for that. · Ask people to wait to visit you until you are at home. And ask them to wash their hands before they touch your baby. · Make sure you have another adult in your home for at least 2 or 3 days after the birth. · During the first 2 weeks, limit when friends and family can visit. · Do not allow visitors who have colds or infections. Make sure all visitors are up to date with their vaccinations. Never let anyone smoke around your baby. · Try to nap when the baby naps. Be aware of postpartum depression · \"Baby blues\" are common for the first 1 to 2 weeks after birth. You may cry or feel sad or irritable for no reason. · For some women, these feelings last longer and are more intense. This is called postpartum depression. · If your symptoms last for more than a few weeks or you feel very depressed, ask your doctor for help. · Postpartum depression can be treated. Support groups and counseling can help. Sometimes medicine can also help. Where can you learn more? Go to http://ryne-marvin.info/. Enter B044 in the search box to learn more about \"Week 38 of Your Pregnancy: Care Instructions. \" 
 Current as of: May 30, 2016 Content Version: 11.1 © 1370-3351 [employer]. Care instructions adapted under license by [employer] (which disclaims liability or warranty for this information). If you have questions about a medical condition or this instruction, always ask your healthcare professional. [address] any warranty or liability for your use of this information. Pregnancy Precautions: Care Instructions Your Care Instructions There is no sure way to prevent labor before your due date ( labor) or to prevent most other pregnancy problems. But there are things you can do to increase your chances of a healthy pregnancy. Go to your appointments, follow your doctor's advice, and take good care of yourself. Eat well, and exercise (if your doctor agrees). And make sure to drink plenty of water. Follow-up care is a key part of your treatment and safety. Be sure to make and go to all appointments, and call your doctor if you are having problems. It's also a good idea to know your test results and keep a list of the medicines you take. How can you care for yourself at home? · Make sure you go to your prenatal appointments. At each visit, your doctor will check your blood pressure. Your doctor will also check to see if you have protein in your urine. High blood pressure and protein in urine are signs of preeclampsia. This condition can be dangerous for you and your baby. · Drink plenty of fluids, enough so that your urine is light yellow or clear like water. Dehydration can cause contractions. If you have kidney, heart, or liver disease and have to limit fluids, talk with your doctor before you increase the amount of fluids you drink. · Tell your doctor right away if you notice any symptoms of an infection, such as: ¨ Burning when you urinate. ¨ A foul-smelling discharge from your vagina. ¨ Vaginal itching. ¨ Unexplained fever. ¨ Unusual pain or soreness in your uterus or lower belly. · Eat a balanced diet. Include plenty of foods that are high in calcium and iron. ¨ Foods high in calcium include milk, cheese, yogurt, almonds, and broccoli. ¨ Foods high in iron include red meat, shellfish, poultry, eggs, beans, raisins, whole-grain bread, and leafy green vegetables. · Do not smoke. If you need help quitting, talk to your doctor about stop-smoking programs and medicines. These can increase your chances of quitting for good. · Do not drink alcohol or use illegal drugs. · Follow your doctor's directions about activity. Your doctor will let you know how much, if any, exercise you can do. · Ask your doctor if you can have sex. If you are at risk for early labor, your doctor may ask you to not have sex. · Take care to prevent falls. During pregnancy, your joints are loose, and your balance is off. Sports such as bicycling, skiing, or in-line skating can increase your risk of falling. And don't ride horses or motorcycles, dive, water ski, scuba dive, or parachute jump while you are pregnant. · Avoid getting very hot. Do not use saunas or hot tubs. Avoid staying out in the sun in hot weather for long periods. Take acetaminophen (Tylenol) to lower a high fever. · Do not take any over-the-counter or herbal medicines or supplements without talking to your doctor or pharmacist first. 
When should you call for help? Call 911 anytime you think you may need emergency care. For example, call if: 
· You passed out (lost consciousness). · You have severe vaginal bleeding. · You have severe pain in your belly or pelvis. · You have had fluid gushing or leaking from your vagina and you know or think the umbilical cord is bulging into your vagina. If this happens, immediately get down on your knees so your rear end (buttocks) is higher than your head. This will decrease the pressure on the cord until help arrives. Call your doctor now or seek immediate medical care if: 
· You have signs of preeclampsia, such as: 
¨ Sudden swelling of your face, hands, or feet. ¨ New vision problems (such as dimness or blurring). ¨ A severe headache. · You have any vaginal bleeding. · You have belly pain or cramping. · You have a fever. · You have had regular contractions (with or without pain) for an hour. This means that you have 8 or more within 1 hour or 4 or more in 20 minutes after you change your position and drink fluids. · You have a sudden release of fluid from your vagina. · You have low back pain or pelvic pressure that does not go away. · You notice that your baby has stopped moving or is moving much less than normal. 
Watch closely for changes in your health, and be sure to contact your doctor if you have any problems. Where can you learn more? Go to http://ryne-marvin.info/. Enter [id number] in the search box to learn more about \"Pregnancy Precautions: Care Instructions. \" Current as of: May 30, 2016 Content Version: 11.1 © 7486-6436 [employer]. Care instructions adapted under license by [employer] (which disclaims liability or warranty for this information). If you have questions about a medical condition or this instruction, always ask your healthcare professional. [address] any warranty or liability for your use of this information. Yumiko Mixon Contractions: Care Instructions Your Care Instructions [place] contractions prepare your uterus for labor. Think of them as a \"warm-up\" exercise that your body does. You may begin to feel them between the 28th and 30th weeks of your pregnancy. But they start as early as the 20th week. [place] contractions usually occur more often during the ninth month.  They may go away when you are active and return when you rest. These contractions are like mild contractions of true labor, but they occur less often. (You feel fewer than 8 in an hour.) They don't cause your cervix to open. It may be hard for you to tell the difference between [place] contractions and true labor, especially in your first pregnancy. Follow-up care is a key part of your treatment and safety. Be sure to make and go to all appointments, and call your doctor if you are having problems. It's also a good idea to know your test results and keep a list of the medicines you take. How can you care for yourself at home? · Try a warm bath to help relieve muscle tension and reduce pain. · Change positions every 30 minutes. Take breaks if you must sit for a long time. Get up and walk around. · Drink plenty of water, enough so that your urine is light yellow or clear like water. · Taking short walks may help you feel better. Your doctor needs to check any contractions that are getting stronger or closer together. Where can you learn more? Go to http://ryne-marvin.info/. Enter [id number] in the search box to learn more about \"[place] Contractions: Care Instructions. \" Current as of: May 30, 2016 Content Version: 11.1 © 5761-3871 [employer]. Care instructions adapted under license by [employer] (which disclaims liability or warranty for this information). If you have questions about a medical condition or this instruction, always ask your healthcare professional. [place] [address] any warranty or liability for your use of this information. Discharge Orders None Introducing [facility]! Dear Fanny Wolff: Thank you for requesting a [employer] account. Our records indicate that you already have an active [employer] account. You can access your account anytime at [email or web address]. [email or web address] Did you know that you can access your hospital and ER discharge instructions at any time in [employer]?   You can also review all of your test results from your hospital stay or ER visit. Additional Information If you have questions, please visit the Frequently Asked Questions section of the [employer] website at [email or web address] Remember, MyChart is NOT to be used for urgent needs. For medical emergencies, dial 911. Now available from your iPhone and Android! General Information Please provide this summary of care documentation to your next provider. Patient Signature:  ____________________________________________________________ Date:  ____________________________________________________________  
  
[facility]:  ____________________________________________________________ Date:  ____________________________________________________________

## 2017-03-11 NOTE — PROGRESS NOTES
Received SBAR report from Phil Álvarez RN; assuming care of pt at 46. Reactive NST, cervix unchanged from yesterday's office visit exam. Pt sent to ambulate x 2 hours, will recheck cervix after two hours and determine plan of care  0945 Cervix re-check: unchanged; Dr Xiao Monge notified, received orders to discharge to home after reactive NST. 1010 reactive NST, removed from monitoring for discharge to home. 21  discharge instructions reviewed with patient, opportunity to ask questions and seek clarifications. Patient verbalizes understanding and states she is comfortable going home at this time. Registration at bedside. 1033 Patient ambulates off unit in stable condition accompanied by spouse.

## 2017-03-11 NOTE — DISCHARGE INSTRUCTIONS
Week 38 of Your Pregnancy: Care Instructions  Your Care Instructions    Believe it or not, your baby is almost here. You may have ideas about your baby's personality because of how much he or she moves. Or you may have noticed how he or she responds to sounds, warmth, cold, and light. You may even know what kind of music your baby likes. By now, you have a better idea of what to expect during delivery. You may have talked about your birth preferences with your doctor. But even if you want a vaginal birth, it is a good idea to learn about  births.  birth means that your baby is born through a cut (incision) in your lower belly. It is sometimes the best choice for the health of the baby and the mother. This care sheet can help you understand  births. It also gives you information about what to expect after your baby is born. And it helps you understand more about postpartum depression. Follow-up care is a key part of your treatment and safety. Be sure to make and go to all appointments, and call your doctor if you are having problems. It's also a good idea to know your test results and keep a list of the medicines you take. How can you care for yourself at home? Learn about  birth  · Most C-sections are unplanned. They are done because of problems that occur during labor. These problems might include:  ¨ Labor that slows or stops. ¨ High blood pressure or other problems for the mother. ¨ Signs of distress in the baby. These signs may include a very fast or slow heart rate. · Although most mothers and babies do well after , it is major surgery. It has more risks than a vaginal delivery. · In some cases, a planned  may be safer than a vaginal delivery. This may be the case if:  ¨ The mother has a health problem, such as a heart condition. ¨ The baby isn't in a head-down position for delivery. This is called a breech position.   ¨ The uterus has scars from past surgeries. This could increase the chance of a tear in the uterus. ¨ There is a problem with the placenta. ¨ The mother has an infection, such as genital herpes, that could be spread to the baby. ¨ The mother is having twins or more. ¨ The baby weighs 9 to 10 pounds or more. · Because of the risks of , planned C-sections generally should be done only for medical reasons. And a planned  should be done at 39 weeks or later unless there is a medical reason to do it sooner. Know what to expect after delivery, and plan for the first few weeks at home  · You, your baby, and your partner or  will get identification bands. Only people with matching bands can  the baby from the nursery. · You will learn how to feed, diaper, and bathe your baby. And you will learn how to care for the umbilical cord stump. If your baby will be circumcised, you will also learn how to care for that. · Ask people to wait to visit you until you are at home. And ask them to wash their hands before they touch your baby. · Make sure you have another adult in your home for at least 2 or 3 days after the birth. · During the first 2 weeks, limit when friends and family can visit. · Do not allow visitors who have colds or infections. Make sure all visitors are up to date with their vaccinations. Never let anyone smoke around your baby. · Try to nap when the baby naps. Be aware of postpartum depression  · \"Baby blues\" are common for the first 1 to 2 weeks after birth. You may cry or feel sad or irritable for no reason. · For some women, these feelings last longer and are more intense. This is called postpartum depression. · If your symptoms last for more than a few weeks or you feel very depressed, ask your doctor for help. · Postpartum depression can be treated. Support groups and counseling can help. Sometimes medicine can also help. Where can you learn more?   Go to http://ryne-marvin.info/. Enter B044 in the search box to learn more about \"Week 38 of Your Pregnancy: Care Instructions. \"  Current as of: May 30, 2016  Content Version: 11.1  © 3958-6114 [employer]. Care instructions adapted under license by [email or web address] (which disclaims liability or warranty for this information). If you have questions about a medical condition or this instruction, always ask your healthcare professional. [address] any warranty or liability for your use of this information. Pregnancy Precautions: Care Instructions  Your Care Instructions  There is no sure way to prevent labor before your due date ( labor) or to prevent most other pregnancy problems. But there are things you can do to increase your chances of a healthy pregnancy. Go to your appointments, follow your doctor's advice, and take good care of yourself. Eat well, and exercise (if your doctor agrees). And make sure to drink plenty of water. Follow-up care is a key part of your treatment and safety. Be sure to make and go to all appointments, and call your doctor if you are having problems. It's also a good idea to know your test results and keep a list of the medicines you take. How can you care for yourself at home? · Make sure you go to your prenatal appointments. At each visit, your doctor will check your blood pressure. Your doctor will also check to see if you have protein in your urine. High blood pressure and protein in urine are signs of preeclampsia. This condition can be dangerous for you and your baby. · Drink plenty of fluids, enough so that your urine is light yellow or clear like water. Dehydration can cause contractions. If you have kidney, heart, or liver disease and have to limit fluids, talk with your doctor before you increase the amount of fluids you drink.   · Tell your doctor right away if you notice any symptoms of an infection, such as:  ¨ Burning when you urinate. ¨ A foul-smelling discharge from your vagina. ¨ Vaginal itching. ¨ Unexplained fever. ¨ Unusual pain or soreness in your uterus or lower belly. · Eat a balanced diet. Include plenty of foods that are high in calcium and iron. ¨ Foods high in calcium include milk, cheese, yogurt, almonds, and broccoli. ¨ Foods high in iron include red meat, shellfish, poultry, eggs, beans, raisins, whole-grain bread, and leafy green vegetables. · Do not smoke. If you need help quitting, talk to your doctor about stop-smoking programs and medicines. These can increase your chances of quitting for good. · Do not drink alcohol or use illegal drugs. · Follow your doctor's directions about activity. Your doctor will let you know how much, if any, exercise you can do. · Ask your doctor if you can have sex. If you are at risk for early labor, your doctor may ask you to not have sex. · Take care to prevent falls. During pregnancy, your joints are loose, and your balance is off. Sports such as bicycling, skiing, or in-line skating can increase your risk of falling. And don't ride horses or motorcycles, dive, water ski, scuba dive, or parachute jump while you are pregnant. · Avoid getting very hot. Do not use saunas or hot tubs. Avoid staying out in the sun in hot weather for long periods. Take acetaminophen (Tylenol) to lower a high fever. · Do not take any over-the-counter or herbal medicines or supplements without talking to your doctor or pharmacist first.  When should you call for help? Call 911 anytime you think you may need emergency care. For example, call if:  · You passed out (lost consciousness). · You have severe vaginal bleeding. · You have severe pain in your belly or pelvis. · You have had fluid gushing or leaking from your vagina and you know or think the umbilical cord is bulging into your vagina.  If this happens, immediately get down on your knees so your rear end (buttocks) is higher than your head. This will decrease the pressure on the cord until help arrives. Call your doctor now or seek immediate medical care if:  · You have signs of preeclampsia, such as:  ¨ Sudden swelling of your face, hands, or feet. ¨ New vision problems (such as dimness or blurring). ¨ A severe headache. · You have any vaginal bleeding. · You have belly pain or cramping. · You have a fever. · You have had regular contractions (with or without pain) for an hour. This means that you have 8 or more within 1 hour or 4 or more in 20 minutes after you change your position and drink fluids. · You have a sudden release of fluid from your vagina. · You have low back pain or pelvic pressure that does not go away. · You notice that your baby has stopped moving or is moving much less than normal.  Watch closely for changes in your health, and be sure to contact your doctor if you have any problems. Where can you learn more? Go to http://ryne-marvin.info/. Enter [id number] in the search box to learn more about \"Pregnancy Precautions: Care Instructions. \"  Current as of: May 30, 2016  Content Version: 11.1  © 8478-8148 [employer] customer insight. Care instructions adapted under license by [employer] (which disclaims liability or warranty for this information). If you have questions about a medical condition or this instruction, always ask your healthcare professional. [place] [address] any warranty or liability for your use of this information. [place] Contractions: Care Instructions  Your Care Instructions  Odessa Driver contractions prepare your uterus for labor. Think of them as a \"warm-up\" exercise that your body does. You may begin to feel them between the 28th and 30th weeks of your pregnancy. But they start as early as the 20th week. Liam Driver contractions usually occur more often during the ninth month.  They may go away when you are active and return when you rest. These contractions are like mild contractions of true labor, but they occur less often. (You feel fewer than 8 in an hour.) They don't cause your cervix to open. It may be hard for you to tell the difference between Patricia Genera contractions and true labor, especially in your first pregnancy. Follow-up care is a key part of your treatment and safety. Be sure to make and go to all appointments, and call your doctor if you are having problems. It's also a good idea to know your test results and keep a list of the medicines you take. How can you care for yourself at home? · Try a warm bath to help relieve muscle tension and reduce pain. · Change positions every 30 minutes. Take breaks if you must sit for a long time. Get up and walk around. · Drink plenty of water, enough so that your urine is light yellow or clear like water. · Taking short walks may help you feel better. Your doctor needs to check any contractions that are getting stronger or closer together. Where can you learn more? Go to http://ryne-marvin.info/. Enter [id number] in the search box to learn more about \"Yates Driver Contractions: Care Instructions. \"  Current as of: May 30, 2016  Content Version: 11.1  © 3510-1111 [employer]. Care instructions adapted under license by [employer] (which disclaims liability or warranty for this information). If you have questions about a medical condition or this instruction, always ask your healthcare professional. [place] [address] any warranty or liability for your use of this information.

## 2017-03-14 ENCOUNTER — ROUTINE PRENATAL (OUTPATIENT)
Dept: OBGYN CLINIC | Age: 32
End: 2017-03-14

## 2017-03-14 VITALS
DIASTOLIC BLOOD PRESSURE: 64 MMHG | HEIGHT: 68 IN | SYSTOLIC BLOOD PRESSURE: 122 MMHG | BODY MASS INDEX: 36.68 KG/M2 | RESPIRATION RATE: 19 BRPM | WEIGHT: 242 LBS

## 2017-03-14 DIAGNOSIS — Z34.83 PRENATAL CARE, SUBSEQUENT PREGNANCY, THIRD TRIMESTER: Primary | ICD-10-CM

## 2017-03-14 NOTE — PROGRESS NOTES
[facility]  [email or web address]  [phone]    Myrla Runner, MD, FACOG     Follow-up OB visit    Chief Complaint   Patient presents with    Routine Prenatal Visit       Vitals:    17 1356   BP: 122/64   Resp: 19   Weight: 242 lb (109.8 kg)   Height: 5' 8\" (1.727 m)       Patient Active Problem List    Diagnosis Date Noted    Pregnant and not yet delivered 2017    Prenatal care of multigravida, antepartum 2017    Varicose vein of leg 2016    Pregnancy 2016    Abdominal pain in pregnancy 2013       The patient reports the following concerns: none    Flu vaccine: received this season  Tdap: complete    See PN flowsheet for exam    33 y.o.  38w3d   Encounter Diagnosis   Name Primary?  Prenatal care, subsequent pregnancy, third trimester Yes     Disc rba of IOL; consider > 39 wks with LGA   [] SAB/bleeding precautions reviewed   [] PTL/PPROM precautions reviewed   [] Labor precautions reviewed   [] Fetal kick counts discussed   [] Labs reviewed with patient   [] Kiera Castelan precautions reviewed   [] Consent reviewed   [] Handouts given to pt   [] Glucola handout    [] GBS/labor/Magic Hour handout   []    []    []    []    Follow-up Disposition: Not on File    No orders of the defined types were placed in this encounter.       Myrla Runner, MD

## 2017-03-14 NOTE — MR AVS SNAPSHOT
Visit Information Date & Time Provider Department Dept. Phone Encounter #  
 3/14/2017  1:40 PM MD Harsha Guevara [phone] [id number] Your Appointments 3/22/2017  4:10 PM  
OB VISIT with MD Harsha Guevara ([facility]) Appt Note: 39wk fob TP  
 [address] [place] [address]  
[phone]  
  
   
 [address] [address]  
  
    
 3/28/2017  2:40 PM  
OB VISIT with MD Harsha Guevara ([facility]) Appt Note: marquez [address] [place] [address]  
[phone]  
  
    
 3/29/2017  7:00 AM  
PROCEDURE with MD Harsha Guevara ([facility]) Appt Note: Induction (marquez) [address] [facility] [phone]  
[facility] [address] [place] [address] Upcoming Health Maintenance Date Due  
 PAP AKA CERVICAL CYTOLOGY 8/18/2019 Allergies as of 3/14/2017  Review Complete On: 3/11/2017 By: Jeramy Keller RN Severity Noted Reaction Type Reactions Penicillins  05/12/2013    Rash, Nausea and Vomiting  
 Sulfa (Sulfonamide Antibiotics)  05/12/2013    Hives Current Immunizations  Reviewed on 2/6/2017 Name Date Influenza Vaccine (Quad) PF 10/11/2016 Tdap 2/16/2017, 5/14/2013 12:35 PM  
  
 Not reviewed this visit You Were Diagnosed With   
  
 Codes Comments Prenatal care, subsequent pregnancy, third trimester    -  Primary ICD-10-CM: Z34.83 ICD-9-CM: V22.1 Vitals BP Resp Height(growth percentile) Weight(growth percentile) LMP BMI  
 122/64 (BP 1 Location: Left arm, BP Patient Position: Sitting) 19 5' 8\" (1.727 m) 242 lb (109.8 kg) 06/20/2016 (Exact Date) 36.8 kg/m2 OB Status Smoking Status Pregnant Never Smoker BMI and BSA Data Body Mass Index Body Surface Area  
 36.8 kg/m 2 2.3 m 2 Preferred Pharmacy Pharmacy Name Phone [id number] , [address] [facility]  [phone] Your Updated Medication List  
  
   
This list is accurate as of: 3/14/17  2:09 PM.  Always use your most recent med list.  
  
  
  
  
 acetaminophen-codeine 300-30 mg per tablet Commonly known as:  TYLENOL-CODEINE #3 Take 1 Tab by mouth every four (4) hours as needed for Pain. Max Daily Amount: 6 Tabs. calcium carbonate 400 mg Chew Commonly known as:  MYLANTA Take 1 Tab by mouth three (3) times daily (with meals). PRENATAL 19 (WITH DOCUSATE) 29-1 mg Tab Generic drug:  prenatal vit-fe fum-fa-dss Take  by mouth. ROBITUSSIN PE PO Take  by mouth. SUDAFED PO Take  by mouth. TYLENOL 325 mg tablet Generic drug:  acetaminophen Take  by mouth every four (4) hours as needed for Pain. Introducing [facility]! Dear Kaylan: Thank you for requesting a [employer] account. Our records indicate that you already have an active [employer] account. You can access your account anytime at [email or web address] Did you know that you can access your hospital and ER discharge instructions at any time in [employer]? You can also review all of your test results from your hospital stay or ER visit. Additional Information If you have questions, please visit the Frequently Asked Questions section of the [employer] website at [email or web address] Remember, [employer] is NOT to be used for urgent needs. For medical emergencies, dial 911. Now available from your iPhone and Android! Please provide this summary of care documentation to your next provider. Your primary care clinician is listed as Kym Hurt. If you have any questions after today's visit, please call [phone].

## 2017-03-21 ENCOUNTER — HOSPITAL ENCOUNTER (INPATIENT)
Age: 32
LOS: 2 days | Discharge: HOME | End: 2017-03-23
Attending: OBSTETRICS & GYNECOLOGY | Admitting: OBSTETRICS & GYNECOLOGY
Payer: COMMERCIAL

## 2017-03-21 ENCOUNTER — ANESTHESIA EVENT (OUTPATIENT)
Dept: LABOR AND DELIVERY | Age: 32
End: 2017-03-21
Payer: COMMERCIAL

## 2017-03-21 ENCOUNTER — ANESTHESIA (OUTPATIENT)
Dept: LABOR AND DELIVERY | Age: 32
End: 2017-03-21
Payer: COMMERCIAL

## 2017-03-21 LAB
BASOPHILS # BLD AUTO: 0 K/UL (ref 0–0.1)
BASOPHILS # BLD: 0 % (ref 0–1)
EOSINOPHIL # BLD: 0.1 K/UL (ref 0–0.4)
EOSINOPHIL NFR BLD: 1 % (ref 0–7)
ERYTHROCYTE [DISTWIDTH] IN BLOOD BY AUTOMATED COUNT: 12.9 % (ref 11.5–14.5)
HCT VFR BLD AUTO: 31 % (ref 35–47)
HGB BLD-MCNC: 10.3 G/DL (ref 11.5–16)
LYMPHOCYTES # BLD AUTO: 22 % (ref 12–49)
LYMPHOCYTES # BLD: 1.4 K/UL (ref 0.8–3.5)
MCH RBC QN AUTO: 29.4 PG (ref 26–34)
MCHC RBC AUTO-ENTMCNC: 33.2 G/DL (ref 30–36.5)
MCV RBC AUTO: 88.6 FL (ref 80–99)
MONOCYTES # BLD: 0.6 K/UL (ref 0–1)
MONOCYTES NFR BLD AUTO: 9 % (ref 5–13)
NEUTS SEG # BLD: 4.4 K/UL (ref 1.8–8)
NEUTS SEG NFR BLD AUTO: 68 % (ref 32–75)
PLATELET # BLD AUTO: 169 K/UL (ref 150–400)
RBC # BLD AUTO: 3.5 M/UL (ref 3.8–5.2)
WBC # BLD AUTO: 6.5 K/UL (ref 3.6–11)

## 2017-03-21 PROCEDURE — 0KQM0ZZ REPAIR PERINEUM MUSCLE, OPEN APPROACH: ICD-10-PCS | Performed by: OBSTETRICS & GYNECOLOGY

## 2017-03-21 PROCEDURE — 77030018749 HC HK AMNIO DISP DERY -A

## 2017-03-21 PROCEDURE — 74011000250 HC RX REV CODE- 250

## 2017-03-21 PROCEDURE — 77030034850

## 2017-03-21 PROCEDURE — 77030014125 HC TY EPDRL BBMI -B: Performed by: ANESTHESIOLOGY

## 2017-03-21 PROCEDURE — 10907ZC DRAINAGE OF AMNIOTIC FLUID, THERAPEUTIC FROM PRODUCTS OF CONCEPTION, VIA NATURAL OR ARTIFICIAL OPENING: ICD-10-PCS | Performed by: OBSTETRICS & GYNECOLOGY

## 2017-03-21 PROCEDURE — 65270000029 HC RM PRIVATE

## 2017-03-21 PROCEDURE — 74011250636 HC RX REV CODE- 250/636: Performed by: OBSTETRICS & GYNECOLOGY

## 2017-03-21 PROCEDURE — 85025 COMPLETE CBC W/AUTO DIFF WBC: CPT | Performed by: OBSTETRICS & GYNECOLOGY

## 2017-03-21 PROCEDURE — 36415 COLL VENOUS BLD VENIPUNCTURE: CPT | Performed by: OBSTETRICS & GYNECOLOGY

## 2017-03-21 PROCEDURE — 75410000000 HC DELIVERY VAGINAL/SINGLE: Performed by: OBSTETRICS & GYNECOLOGY

## 2017-03-21 PROCEDURE — 3E033VJ INTRODUCTION OF OTHER HORMONE INTO PERIPHERAL VEIN, PERCUTANEOUS APPROACH: ICD-10-PCS | Performed by: OBSTETRICS & GYNECOLOGY

## 2017-03-21 PROCEDURE — 75410000003 HC RECOV DEL/VAG/CSECN EA 0.5 HR: Performed by: OBSTETRICS & GYNECOLOGY

## 2017-03-21 PROCEDURE — 76060000078 HC EPIDURAL ANESTHESIA: Performed by: ANESTHESIOLOGY

## 2017-03-21 PROCEDURE — 74011250637 HC RX REV CODE- 250/637: Performed by: OBSTETRICS & GYNECOLOGY

## 2017-03-21 PROCEDURE — 74011250636 HC RX REV CODE- 250/636

## 2017-03-21 PROCEDURE — 74011250636 HC RX REV CODE- 250/636: Performed by: ANESTHESIOLOGY

## 2017-03-21 PROCEDURE — 75410000002 HC LABOR FEE PER 1 HR: Performed by: OBSTETRICS & GYNECOLOGY

## 2017-03-21 RX ORDER — ONDANSETRON 4 MG/1
4 TABLET, ORALLY DISINTEGRATING ORAL
Status: DISCONTINUED | OUTPATIENT
Start: 2017-03-21 | End: 2017-03-23 | Stop reason: HOSPADM

## 2017-03-21 RX ORDER — OXYTOCIN/RINGER'S LACTATE 20/1000 ML
125-500 PLASTIC BAG, INJECTION (ML) INTRAVENOUS ONCE
Status: DISCONTINUED | OUTPATIENT
Start: 2017-03-21 | End: 2017-03-21 | Stop reason: SDUPTHER

## 2017-03-21 RX ORDER — NALOXONE HYDROCHLORIDE 0.4 MG/ML
0.4 INJECTION, SOLUTION INTRAMUSCULAR; INTRAVENOUS; SUBCUTANEOUS AS NEEDED
Status: DISCONTINUED | OUTPATIENT
Start: 2017-03-21 | End: 2017-03-21 | Stop reason: SDUPTHER

## 2017-03-21 RX ORDER — SODIUM CHLORIDE 0.9 % (FLUSH) 0.9 %
5-10 SYRINGE (ML) INJECTION EVERY 8 HOURS
Status: DISCONTINUED | OUTPATIENT
Start: 2017-03-21 | End: 2017-03-23

## 2017-03-21 RX ORDER — IBUPROFEN 800 MG/1
800 TABLET ORAL EVERY 8 HOURS
Status: DISCONTINUED | OUTPATIENT
Start: 2017-03-22 | End: 2017-03-23 | Stop reason: HOSPADM

## 2017-03-21 RX ORDER — ZOLPIDEM TARTRATE 5 MG/1
5 TABLET ORAL
Status: DISCONTINUED | OUTPATIENT
Start: 2017-03-21 | End: 2017-03-23 | Stop reason: HOSPADM

## 2017-03-21 RX ORDER — NALOXONE HYDROCHLORIDE 0.4 MG/ML
0.4 INJECTION, SOLUTION INTRAMUSCULAR; INTRAVENOUS; SUBCUTANEOUS AS NEEDED
Status: DISCONTINUED | OUTPATIENT
Start: 2017-03-21 | End: 2017-03-23 | Stop reason: HOSPADM

## 2017-03-21 RX ORDER — IBUPROFEN 600 MG/1
600 TABLET ORAL
Qty: 30 TAB | Refills: 0 | Status: SHIPPED | OUTPATIENT
Start: 2017-03-21 | End: 2017-05-09

## 2017-03-21 RX ORDER — OXYCODONE AND ACETAMINOPHEN 5; 325 MG/1; MG/1
TABLET ORAL
Qty: 12 TAB | Refills: 0 | Status: SHIPPED | OUTPATIENT
Start: 2017-03-21 | End: 2017-05-09

## 2017-03-21 RX ORDER — SIMETHICONE 80 MG
80 TABLET,CHEWABLE ORAL
Status: DISCONTINUED | OUTPATIENT
Start: 2017-03-21 | End: 2017-03-23 | Stop reason: HOSPADM

## 2017-03-21 RX ORDER — FENTANYL/BUPIVACAINE/NS/PF 2-1250MCG
1-16 PREFILLED PUMP RESERVOIR EPIDURAL CONTINUOUS
Status: DISCONTINUED | OUTPATIENT
Start: 2017-03-21 | End: 2017-03-23

## 2017-03-21 RX ORDER — SODIUM CHLORIDE 0.9 % (FLUSH) 0.9 %
5-10 SYRINGE (ML) INJECTION AS NEEDED
Status: DISCONTINUED | OUTPATIENT
Start: 2017-03-21 | End: 2017-03-23 | Stop reason: HOSPADM

## 2017-03-21 RX ORDER — SIMETHICONE 80 MG
80 TABLET,CHEWABLE ORAL
Status: DISCONTINUED | OUTPATIENT
Start: 2017-03-21 | End: 2017-03-21 | Stop reason: SDUPTHER

## 2017-03-21 RX ORDER — LIDOCAINE HYDROCHLORIDE AND EPINEPHRINE 15; 5 MG/ML; UG/ML
INJECTION, SOLUTION EPIDURAL AS NEEDED
Status: DISCONTINUED | OUTPATIENT
Start: 2017-03-21 | End: 2017-03-21 | Stop reason: HOSPADM

## 2017-03-21 RX ORDER — OXYTOCIN IN 5 % DEXTROSE 30/500 ML
1-25 PLASTIC BAG, INJECTION (ML) INTRAVENOUS
Status: DISCONTINUED | OUTPATIENT
Start: 2017-03-21 | End: 2017-03-23

## 2017-03-21 RX ORDER — IBUPROFEN 800 MG/1
800 TABLET ORAL
Status: DISCONTINUED | OUTPATIENT
Start: 2017-03-21 | End: 2017-03-23 | Stop reason: HOSPADM

## 2017-03-21 RX ORDER — BUPIVACAINE HYDROCHLORIDE 2.5 MG/ML
INJECTION, SOLUTION EPIDURAL; INFILTRATION; INTRACAUDAL AS NEEDED
Status: DISCONTINUED | OUTPATIENT
Start: 2017-03-21 | End: 2017-03-21 | Stop reason: HOSPADM

## 2017-03-21 RX ORDER — OXYTOCIN/RINGER'S LACTATE 20/1000 ML
125-500 PLASTIC BAG, INJECTION (ML) INTRAVENOUS ONCE
Status: ACTIVE | OUTPATIENT
Start: 2017-03-21 | End: 2017-03-22

## 2017-03-21 RX ORDER — SODIUM CHLORIDE, SODIUM LACTATE, POTASSIUM CHLORIDE, CALCIUM CHLORIDE 600; 310; 30; 20 MG/100ML; MG/100ML; MG/100ML; MG/100ML
125 INJECTION, SOLUTION INTRAVENOUS CONTINUOUS
Status: DISCONTINUED | OUTPATIENT
Start: 2017-03-21 | End: 2017-03-23

## 2017-03-21 RX ORDER — FENTANYL CITRATE 50 UG/ML
INJECTION, SOLUTION INTRAMUSCULAR; INTRAVENOUS AS NEEDED
Status: DISCONTINUED | OUTPATIENT
Start: 2017-03-21 | End: 2017-03-21 | Stop reason: HOSPADM

## 2017-03-21 RX ORDER — MAG HYDROX/ALUMINUM HYD/SIMETH 200-200-20
30 SUSPENSION, ORAL (FINAL DOSE FORM) ORAL
Status: DISCONTINUED | OUTPATIENT
Start: 2017-03-21 | End: 2017-03-21 | Stop reason: HOSPADM

## 2017-03-21 RX ORDER — OXYCODONE AND ACETAMINOPHEN 5; 325 MG/1; MG/1
1 TABLET ORAL
Status: DISCONTINUED | OUTPATIENT
Start: 2017-03-21 | End: 2017-03-23 | Stop reason: HOSPADM

## 2017-03-21 RX ORDER — DIPHENHYDRAMINE HCL 25 MG
25 CAPSULE ORAL
Status: DISCONTINUED | OUTPATIENT
Start: 2017-03-21 | End: 2017-03-23 | Stop reason: HOSPADM

## 2017-03-21 RX ORDER — HYDROCORTISONE ACETATE PRAMOXINE HCL 2.5; 1 G/100G; G/100G
CREAM TOPICAL AS NEEDED
Status: DISCONTINUED | OUTPATIENT
Start: 2017-03-21 | End: 2017-03-23 | Stop reason: HOSPADM

## 2017-03-21 RX ORDER — ACETAMINOPHEN 325 MG/1
650 TABLET ORAL
Status: DISCONTINUED | OUTPATIENT
Start: 2017-03-21 | End: 2017-03-23 | Stop reason: HOSPADM

## 2017-03-21 RX ORDER — HYDROCORTISONE ACETATE PRAMOXINE HCL 2.5; 1 G/100G; G/100G
CREAM TOPICAL AS NEEDED
Status: DISCONTINUED | OUTPATIENT
Start: 2017-03-21 | End: 2017-03-21 | Stop reason: SDUPTHER

## 2017-03-21 RX ORDER — DOCUSATE SODIUM 100 MG/1
100 CAPSULE, LIQUID FILLED ORAL
Status: DISCONTINUED | OUTPATIENT
Start: 2017-03-21 | End: 2017-03-23 | Stop reason: HOSPADM

## 2017-03-21 RX ORDER — HYDROCODONE BITARTRATE AND ACETAMINOPHEN 5; 325 MG/1; MG/1
1 TABLET ORAL
Status: DISCONTINUED | OUTPATIENT
Start: 2017-03-21 | End: 2017-03-21 | Stop reason: SDUPTHER

## 2017-03-21 RX ADMIN — FENTANYL CITRATE 75 MCG: 50 INJECTION, SOLUTION INTRAMUSCULAR; INTRAVENOUS at 08:50

## 2017-03-21 RX ADMIN — LIDOCAINE HYDROCHLORIDE AND EPINEPHRINE 3 ML: 15; 5 INJECTION, SOLUTION EPIDURAL at 08:48

## 2017-03-21 RX ADMIN — OXYCODONE HYDROCHLORIDE AND ACETAMINOPHEN 1 TABLET: 5; 325 TABLET ORAL at 19:31

## 2017-03-21 RX ADMIN — SODIUM CHLORIDE, SODIUM LACTATE, POTASSIUM CHLORIDE, AND CALCIUM CHLORIDE 125 ML/HR: 600; 310; 30; 20 INJECTION, SOLUTION INTRAVENOUS at 06:25

## 2017-03-21 RX ADMIN — BUPIVACAINE HYDROCHLORIDE 0.7 ML: 2.5 INJECTION, SOLUTION EPIDURAL; INFILTRATION; INTRACAUDAL at 08:46

## 2017-03-21 RX ADMIN — Medication 2 MILLI-UNITS/MIN: at 06:46

## 2017-03-21 RX ADMIN — IBUPROFEN 800 MG: 800 TABLET, FILM COATED ORAL at 17:58

## 2017-03-21 RX ADMIN — SODIUM CHLORIDE, SODIUM LACTATE, POTASSIUM CHLORIDE, AND CALCIUM CHLORIDE 1000 ML: 600; 310; 30; 20 INJECTION, SOLUTION INTRAVENOUS at 08:33

## 2017-03-21 RX ADMIN — SODIUM CHLORIDE, SODIUM LACTATE, POTASSIUM CHLORIDE, AND CALCIUM CHLORIDE 125 ML/HR: 600; 310; 30; 20 INJECTION, SOLUTION INTRAVENOUS at 12:33

## 2017-03-21 RX ADMIN — FENTANYL CITRATE 25 MCG: 50 INJECTION, SOLUTION INTRAMUSCULAR; INTRAVENOUS at 08:46

## 2017-03-21 RX ADMIN — FENTANYL 0.2 MG/100ML-BUPIV 0.125%-NACL 0.9% EPIDURAL INJ 10 ML/HR: 2/0.125 SOLUTION at 09:10

## 2017-03-21 NOTE — ANESTHESIA PREPROCEDURE EVALUATION
Anesthetic History   No history of anesthetic complications            Review of Systems / Medical History  Patient summary reviewed, nursing notes reviewed and pertinent labs reviewed    Pulmonary  Within defined limits                 Neuro/Psych   Within defined limits           Cardiovascular  Within defined limits                Exercise tolerance: >4 METS     GI/Hepatic/Renal  Within defined limits              Endo/Other  Within defined limits           Other Findings              Physical Exam    Airway  Mallampati: II  TM Distance: 4 - 6 cm  Neck ROM: normal range of motion   Mouth opening: Normal     Cardiovascular  Regular rate and rhythm,  S1 and S2 normal,  no murmur, click, rub, or gallop  Rhythm: regular  Rate: normal         Dental  No notable dental hx       Pulmonary  Breath sounds clear to auscultation               Abdominal  GI exam deferred       Other Findings            Anesthetic Plan    ASA: 2  Anesthesia type: epidural and spinal            Anesthetic plan and risks discussed with: Patient and Spouse      Late entry - preop done, questions answered, and consent obtained prior to procedure; note entered after procedure at 8:54 AM.

## 2017-03-21 NOTE — L&D DELIVERY NOTE
Delivery Summary    Patient: Benjamin Pastrana MRN: [id number]  SSN: [id number]    YOB: 1985  Age: 33 y.o. Sex: female        Labor Events:    Labor: No    Rupture Date: 3/21/2017    Rupture Time: 7:55 AM    Rupture Type AROM    Amniotic Fluid Volume: Moderate     Amniotic Fluid Description: Clear       Induction: AROM; Oxytocin         Augmentation: None    Labor Complications: None     Additional Complications:        Cervical Ripening: 3/21/2017  8:38 AM  None      Delivery Events:  Episiotomy: None    Laceration(s): Second degree perineal       Repaired: Yes     Number of Repair Packets: 1    Suture Type and Size: Vicryl 3-0        Estimated Blood Loss (ml): 200        Information for the patient's newbornFelickyree León, Male [[id number]]     Delivery Summary - Baby    Delivery Date: 3/21/2017   Delivery Time: 4:59 PM   Delivery Type: Vaginal, Spontaneous Delivery  Sex:  male  Gestational Age: 39w3d  Delivery Clinician:  Arya Cantu  Living?: Yes   Delivery Location: L&D             APGARS  One minute Five minutes Ten minutes   Skin Color: 0    1       Heart Rate: 2   2         Reflex Irritability: 2   2         Muscle Tone: 2   2       Respiration: 2   2         Total: 8   9           Presentation: Vertex  Position: Left Occiput Anterior  Resuscitation Method:  Suctioning-bulb; Tactile Stimulation     Meconium Stained: None    Cord Information: 3 Vessels   Complications: Nuchal Cord Without Compressions  Cord Blood Sent?:  No    Blood Gases Sent?:  No    Placenta:  Date/Time: 3/21  5:06 PM  Removal: Spontaneous      Appearance: Normal      Measurements:  Birth Weight:      Birth Length:     Head Circumference:       Chest Circumference:      Abdominal Girth: Other Providers:   Karuna Ramirez EMILY R;WIESE, SCOTT F Obstetrician;Primary Nurse;Primary [place] Nurse; Anesthesiologist           Cord Blood Results:  Information for the patient's : Enma Mohamud, Male [[id number]]   No results found for: Natalie Migdalia, PCTDIG, BILI, ABORHEXT, [address]    Information for the patient's :  Enma Mohamud, Male [[id number]]   No results found for: APH, APCO2, APO2, AHCO3, ABEC, ABDC, O2ST, SITE, RSCOM, PHI, PCO2I, PO2I, HCO3I, SO2I, IBD     Information for the patient's :  Enma Mohamud, Male [[id number]]   No results found for: EPHV, PCO2V, PO2V, HCO3V, O2STV, EBDV     Delivery Note    Patient C/C/+2, pushed over intact perineum. LBMI  Tight cord reducible  No shoulder dystocia  Delayed cord clamping  Spontaneous placenta delivery.   Laceration repaired 2nd degree in layers with 3-0 vicryl x2

## 2017-03-21 NOTE — PROGRESS NOTES
0700  Resumed care of the pt from EMELY Lombardo RN. Bedside reporting done. 0745  Dr Roseline Gomez at the bedside to check the pt and go over plan of care. SVE 3/50/-3 AROM mod amount of clear fluid. Pt requesting epidural.  Pre load started. Dr Luis F Herrera informed  9350  Pt sitting at the edge of the bed preparing for epidural placement. Dr Luis F Herrera busy with  Dr Gabriel Putnam to place epidural or CRNA  8:37 AM Dr Andrea Burgos at the bedside to go over anesthesia with the pt.  8849  Time out complete. Dr Lolis Simms explained procedure to the pt.  8:46 AM CSE given  8:47 AM Epidural cath placed  8:48 AM Test dose given  0850  Epidural complete pt resting on her right side. Underpads changed for mod amount of clear fluid. 8:58 AM Pt states she is very comfortable after her epidural placement. 0930  Pt resting in bed on her right side. Denies any needs. Peanut ball between her legs  1030  Pt doing sudko puzzles in bed. 11:45 AM Asked pt if she's like a position change pt stated she was fine the way she is  12:34 PM Pt repositioned to her left side underpad changed. Peanut ball re applied. Alfredo Broach SVE 5/90/-2  1315  Pt asleep  1430  Pt repositioned to her right side. Underpad changed. Peanut ball re applied. Pt denies any needs  1610  pT C/O PRESSURE sve 10. CHAVEZ CATH REMOVED. Emptied for 800cc of clear yellow urine. Trail push done. Dr Roseline Gomez aware. Pt up in [facility]  165  Dr Roseline Gomez called for delivery  1659  Delivery of [facility] strong cry cord cut by FOB  [id number]  Pt skin to skin with her baby. Attempting to breastfeed  1720  Baby latched on and nursing  5:59 PM Pt medicated with Motrin 800mg po for c/o lower abd cramping  1830  Pt states Motrin has helped with her cramping.

## 2017-03-21 NOTE — DISCHARGE SUMMARY
Obstetrical Discharge Summary     Name: Ivet Gutierrez MRN: [id number]  SSN: [id number]    YOB: 1985  Age: 33 y.o. Sex: female      Admit Date: 3/21/2017    Discharge Date: 3/23/2017     Admitting Physician: Casey Allen MD     Attending Physician:  Casey Allen MD     Admission Diagnoses: Pregnancy    Condition on Discharge: Stable    Procedures:     Disposition: to home    Discharge Diagnoses:   Information for the patient's :  Chuyita Rivera, Male [[id number]]   Delivery of a   male infant via Vaginal, Spontaneous Delivery on 3/21/2017 at 4:59 PM  by . Apgars were 8 and 9. Additional Diagnoses:   Hospital Problems  Date Reviewed: 3/14/2017          Codes Class Noted POA    Pregnancy ICD-10-CM: Z33.1  ICD-9-CM: V22.2  2016 Unknown    Overview Addendum 3/20/2017  5:12 PM by Casey Allen MD     IUI - Dr. Maier [facility] 2017 based on IUI c/w LMP  NT: WNL 09/15/2016  FS at [facility] ok PER TP  MSAFP: WNL 10/11/2016   xy  Fu check heart 4wk  17 hgb #10.9  LGA: interested in IOL if favorable   IOL  3/21. Pt notified. Pitocin order on book 3/20/2017                    Lab Results   Component Value Date/Time    Rubella, External 11.20 2016    GrBStrep, External Negative 2017       Hospital Course: Normal hospital course following the delivery. Patient Instructions:   Current Discharge Medication List      START taking these medications    Details   ibuprofen (MOTRIN) 600 mg tablet Take 1 Tab by mouth every six (6) hours as needed for Pain. Take with food. Qty: 30 Tab, Refills: 0      oxyCODONE-acetaminophen (PERCOCET) 5-325 mg per tablet 1-2 Tablets every 4-6 hours as needed  Qty: 12 Tab, Refills: 0         CONTINUE these medications which have NOT CHANGED    Details   prenatal vit-fe fum-fa-dss (PRENATAL 19) 29-1 mg Tab Take  by mouth.          STOP taking these medications       acetaminophen (TYLENOL) 325 mg tablet Comments:   Reason for Stopping: acetaminophen-codeine (TYLENOL-CODEINE #3) 300-30 mg per tablet Comments:   Reason for Stopping:         PSEUDOEPHEDRINE HCL (SUDAFED PO) Comments:   Reason for Stopping:         GUAIFENESIN/PSEUDOEPHEDRNE HCL (ROBITUSSIN PE PO) Comments:   Reason for Stopping:         calcium carbonate (MYLANTA) 400 mg Chew Comments:   Reason for Stopping:               Reference my discharge instructions.     Follow-up Appointments   Procedures    FOLLOW UP VISIT Appointment in: 6 Weeks     Standing Status:   Standing     Number of Occurrences:   1     Order Specific Question:   Appointment in     Answer:   6 Weeks        Signed By:  Galen Strong MD     March 21, 2017

## 2017-03-21 NOTE — IP AVS SNAPSHOT
[facility] 
 
 
 [address] [facility] [address] [facility] 
[phone] Patient: Benjamin Pastrana MRN: [id number] [place]:[birth year] You are allergic to the following Allergen Reactions Penicillins Rash Nausea and Vomiting  
    
 Sulfa (Sulfonamide Antibiotics) Hives Recent Documentation Height Weight Breastfeeding? BMI OB Status Smoking Status 1.727 m 109.8 kg Unknown 36.8 kg/m2 Recent pregnancy Never Smoker Unresulted Labs Order Current Status SAMPLE TO BLOOD BANK In process Emergency Contacts Name Discharge Info Relation Home Work Mobile Michael Sullivan DISCHARGE CAREGIVER [3] Spouse [3] [phone] About your hospitalization You were admitted on:  March 21, 2017 You last received care in the:  [facility] You were discharged on:  March 23, 2017 Unit phone number:  [phone] Why you were hospitalized Your primary diagnosis was:  Not on File Your diagnoses also included:  Pregnancy Providers Seen During Your Hospitalizations Provider Role Specialty Primary office phone Arya Cantu MD Attending Provider Obstetrics & Gynecology [phone] Your Primary Care Physician (PCP) Primary Care Physician Office Phone Office Fax Sandra Huizar [phone] Follow-up Information Follow up With Details Comments Contact Info Shantel Khan MD   [address] 
[address] [address] [phone] Arya Cantu MD In 6 weeks Postpartum visit [address] [employer] [address] [facility] 
[phone] Current Discharge Medication List  
  
START taking these medications Dose & Instructions Dispensing Information Comments Morning Noon Evening Bedtime  
 ibuprofen 600 mg tablet Commonly known as:  MOTRIN Your last dose was: Your next dose is:    
   
   
 Dose:  600 mg Take 1 Tab by mouth every six (6) hours as needed for Pain. Take with food. Quantity:  30 Tab Refills:  0  
     
   
   
   
  
 oxyCODONE-acetaminophen 5-325 mg per tablet Commonly known as:  PERCOCET Your last dose was: Your next dose is:    
   
   
 1-2 Tablets every 4-6 hours as needed Quantity:  12 Tab Refills:  0 CONTINUE these medications which have NOT CHANGED Dose & Instructions Dispensing Information Comments Morning Noon Evening Bedtime PRENATAL 19 (WITH DOCUSATE) 29-1 mg Tab Generic drug:  prenatal vit-fe fum-fa-dss Your last dose was: Your next dose is: Take  by mouth. Refills:  0 STOP taking these medications   
 acetaminophen-codeine 300-30 mg per tablet Commonly known as:  TYLENOL-CODEINE #3  
   
  
 calcium carbonate 400 mg Chew Commonly known as:  MYLANTA ROBITUSSIN PE PO  
   
  
 SUDAFED PO  
   
  
 TYLENOL 325 mg tablet Generic drug:  acetaminophen Where to Get Your Medications These medications were sent to [facility]/pharmacy #[id number]- [address], [address]  [address] [facility] [address] Phone:  [phone]  
  ibuprofen 600 mg tablet Information on where to get these meds will be given to you by the nurse or doctor. ! Ask your nurse or doctor about these medications  
  oxyCODONE-acetaminophen 5-325 mg per tablet Discharge Instructions [address] [place] [address] OB-GYN 
[email or web address] 
[phone] Myrla Runner, MD, FACOG  
 
POST DELIVERY DISCHARGE INSTRUCTIONS 
FROM YOUR PHYSICIAN Name: Zhang Hawkins YOB: 1985 General:  
 
Read all discharge information provided by the hospital 
 
Diet/Diet Restrictions: 
Eat healthy meals and snacks as desired. Eat foods that are high in fiber and low in fat and cholesterol. Drink eight 8-ounce glasses of water daily; avoid excessive caffeine intake. http://www.jude-levine.org/. html 
EliteClients.be Medications:  
See discharge medication list and read instructions carefully. Breast Feeding: 
See instructions from your lactation consult. Call [phone] for more information or to locate a lactation consultant. https://www.mullins.info/ Vaccines: If you received the MMR vaccine postpartum you should wait three months until you get pregnant again. You, and close contacts, should make sure that the Tdap vaccine is up to date. This vaccine can decrease the risk of your baby getting pertussis or \"whooping cough. \" You, and close contacts, should receive the influenza vaccine during flu season when appropriate. SalaryStart.tn Tobacco Use: If you (or other people around the baby) smoke or use tobacco products, please try to  use and quit to improve your health and decrease risk to your baby. LimitBuy.nl. htm Swelling in your Legs: 
There are many fluid changes after delivery and you may have more swelling the first few days after delivery  Continue to drink plenty of water, avoid sitting or standing in one position for too long and elevate your feet above your heart, to help reduce some the pressure you may be feeling in your ankles and legs.  Section Incision: 
Steri-strips or tape strips may be removed gently at home approximately 7- 10 days after surgery. Soaking the strips with a warm, wet cloth or taking a shower may make the strips easier to remove. Metal staples are usually removed within 3 to 10 days, either before you leave the hospital or in the office. Make an appointment if needed. Insorb absorbable staples may be used under the skin but you may see small white pieces as they dissolve. Skin glue or dermabond will fall off with time. Abdominal incisions should be kept clean by showering. It is not necessary to put soap on the incision; plain tap water is adequate. Avoid scrubbing the area and pat dry. The way your scar looks will change over time and may not reach its final appearance for up to a year. The area may feel either numb or sensitive to touch, which is normal. 
 
    
Physical Activity / Restrictions / Safety:  
 
Avoid heavy lifting, no more that 10 pounds, for 2-3 weeks. No driving while taking narcotic pain medication, of if you can not slam on the brakes. No intercourse for 4-6 weeks, no douching or tampon use until seen by your doctor for your postpartum visit. Use condoms as needed for contraception with sexual activity. You may resume normal exercise after you are cleared by your physician at your postpartum check. You may walk for exercise, as tolerated. Discharge Instructions/Special Treatment/Home Care Needs:  
 
Continue your prenatal vitamins while breast feeding or pumping. Continue to use a squirt bottle with warm water on your perineum/bottom/episiotomy after each bathroom use until bleeding stops. Take stool softeners daily. For example, docusate over the counter stool softener. This is especially important if you are taking narcotic pain medications, because they can cause constipation. Call your doctor for the following: If you have a fever over 100.4 degrees by mouth on two readings. If you have persistent vaginal bleeding heavier than a heavy menstrual period or persistent large clots or if you are bleeding so heavy it is making you feel weak. It is normal to pass larger clots when you first get out of bed: but if they persist, notify your physician.  
If you have red streaks or increased swelling of legs, painful red streaks on your breast. 
If you have painful urination, or increased pain, redness or discharge with your incision. If you have any questions or concerns. Pain Management:  
 
Take Acetaminophen (Tylenol), Ibuprofen (Advil, Motrin), prescribed pain medications as directed for pain. Do not take Perocet with Tylenol, they both contain acetaminophen. Use a warm water Sitz bath 3 times daily to relieve episiotomy, bottom/perineum or hemorrhoidal discomfort. Apply heating pad to  incision as needed. For hemorrhoidal discomfort, you can use Tucks and Anusol cream as needed and directed. NSAID information for patients: 
[email or web address] Pain medication/narcotic information for patients:  Take your medicine exactly as prescribed  Store your medicine away from children and in a safe 
place  Do not give your medicine to others  Do not drink alcohol while taking this medicine Follow-Up Care:  
 
Appointment with MD: 
Dr. Angus Gaxiola [phone] Schedule your postpartum visit for six weeks Additional Discharge Instructions Please read all of your discharge instructions Follow all of your medication instructions carefully Call our office on the next business day to schedule your follow-up appointment If you have any questions or concerns, please contact us at [phone] or if the situation is urgent contact  Become a [facility] My Chart user so you can access information, results and appointments: go to [email or web address]. The [employer] is to bring compassion to healthcare and to be good help to those in need.  We aim at providing quality healthcare with an emphasis on respect, justice, compassion, stewardship, integrity, growth and innovation. If you did not receive excellent communication, compassionate care and an outstanding patient experience, please notify Jennayuki Matt at Gabriela@"[email or web address] or [phone] or discuss your concerns with me at your next visit so that we can meet our mission and your expectations Myke Toribio MD 
[facility], [address] 
[email or web address]  
[phone] Good Help to Those in Need® Discharge Orders None [employer] Announcement We are excited to announce that we are making your provider's discharge notes available to you in [employer]. You will see these notes when they are completed and signed by the physician that discharged you from your recent hospital stay. If you have any questions or concerns about any information you see in [employer], please call the Health Information Department where you were seen or reach out to your Primary Care Provider for more information about your plan of care. Introducing [facility]! Dear Nehal Reza: Thank you for requesting a [employer] account. Our records indicate that you already have an active [employer] account. You can access your account anytime at [email or web address] Did you know that you can access your hospital and ER discharge instructions at any time in [employer]? You can also review all of your test results from your hospital stay or ER visit. Additional Information If you have questions, please visit the Frequently Asked Questions section of the [employer] website at [email or web address] Remember, [employer] is NOT to be used for urgent needs. For medical emergencies, dial 911. Now available from your iPhone and Android! General Information Please provide this summary of care documentation to your next provider.  
  
  
    
    
 Patient Signature: ____________________________________________________________ Date:  ____________________________________________________________  
  
Aloma Snellen Provider Signature:  ____________________________________________________________ Date:  ____________________________________________________________

## 2017-03-21 NOTE — IP AVS SNAPSHOT
Current Discharge Medication List  
  
START taking these medications Dose & Instructions Dispensing Information Comments Morning Noon Evening Bedtime  
 ibuprofen 600 mg tablet Commonly known as:  MOTRIN Your last dose was: Your next dose is:    
   
   
 Dose:  600 mg Take 1 Tab by mouth every six (6) hours as needed for Pain. Take with food. Quantity:  30 Tab Refills:  0  
     
   
   
   
  
 oxyCODONE-acetaminophen 5-325 mg per tablet Commonly known as:  PERCOCET Your last dose was: Your next dose is:    
   
   
 1-2 Tablets every 4-6 hours as needed Quantity:  12 Tab Refills:  0 CONTINUE these medications which have NOT CHANGED Dose & Instructions Dispensing Information Comments Morning Noon Evening Bedtime PRENATAL 19 (WITH DOCUSATE) 29-1 mg Tab Generic drug:  prenatal vit-fe fum-fa-dss Your last dose was: Your next dose is: Take  by mouth. Refills:  0 STOP taking these medications   
 acetaminophen-codeine 300-30 mg per tablet Commonly known as:  TYLENOL-CODEINE #3  
   
  
 calcium carbonate 400 mg Chew Commonly known as:  MYLANTA ROBITUSSIN PE PO  
   
  
 SUDAFED PO  
   
  
 TYLENOL 325 mg tablet Generic drug:  acetaminophen Where to Get Your Medications These medications were sent to [facility]/pharmacy #[id number]- [address], [address]  [address] Phone:  [phone]  
  ibuprofen 600 mg tablet Information on where to get these meds will be given to you by the nurse or doctor. ! Ask your nurse or doctor about these medications  
  oxyCODONE-acetaminophen 5-325 mg per tablet

## 2017-03-21 NOTE — PROGRESS NOTES
Pt c/c  Will begin pushing    Dystocia stool in room, extra staffing available prn.      Anticipate

## 2017-03-21 NOTE — PROGRESS NOTES
4121  Bedside shift change report given to OFELIA Mclaughlin RN (oncoming nurse) by EMELY Rodriguez RN (offgoing nurse). Report included the following information SBAR, Kardex, Intake/Output, MAR, Recent Results and Med Rec Status.

## 2017-03-21 NOTE — ANESTHESIA PROCEDURE NOTES
CSE Block    Start time: 3/21/2017 8:41 AM  End time: 3/21/2017 8:52 AM  Performed by: Ana Luisa Phillip by: Kalyani Lang     Pre-Procedure  Indications: procedure for pain    preanesthetic checklist: patient identified, risks and benefits discussed, anesthesia consent, site marked, patient being monitored and timeout performed    Timeout Time: 08:41        Procedure:   Patient Position:  Seated  Prep Region:  Lumbar  Prep: Betadine and patient draped    Location:  L3-4    Epidural Needle:   Needle Type:  Tuohy  Needle Gauge:  18 G  Injection Technique:  Loss of resistance using saline  Attempts:  1    Spinal Needle:   Needle Type:  Pencil-tip  Needle Gauge:  27 G    Catheter:   Catheter Type:  Standard  Catheter Size:  20 G  Catheter at Skin Depth (cm):  10  Depth in Epidural Space (cm):  4  Events: no blood with aspiration, no cerebrospinal fluid with aspiration, no paresthesia and negative aspiration test    Test Dose:  Lidocaine 1.5% w/ epi and negative    Assessment:   Catheter Secured:  Tegaderm and tape  Insertion:  Uncomplicated  Patient tolerance:  Patient tolerated the procedure well with no immediate complications

## 2017-03-21 NOTE — PROGRESS NOTES
03/21/17 1:10 PM  CM met with patient to complete initial assessment and to begin discharge planning. Patient demographics confirmed. Patient and her /FOB Fior Sandhu [phone]) live together and have a 3year old daughter. Patient works as an  and will return to work in June, FOB has a week off from work and will be able to work from home. Patient plans to breastfeed and has a pump to use. [facility] will provide medical follow up for the baby. Patient has car seat, crib, clothing, and other necessary supplies. Patient denied need for [facility] and Medicaid services. There is a large support network of family and friends to assist as needed. Care Management Interventions  PCP Verified by CM:  Yes  Transition of Care Consult (CM Consult): Discharge Planning  Current Support Network: Lives with Spouse  Confirm Follow Up Transport: Family  Plan discussed with Pt/Family/Caregiver: Yes  Discharge Location  Discharge Placement: [address] [place] [address]

## 2017-03-21 NOTE — H&P
History & Physical    Name: Josef Riggins MRN: [id number]  SSN: [id number]    YOB: 1985  Age: 33 y.o. Sex: female        Subjective:     Estimated Date of Delivery: 3/25/17  OB History      Para Term  AB TAB SAB Ectopic Multiple Living    2 1 1       1        Obstetric Comments    Pushed 10 minutes. Labor augmented pitocin and AROM. Ms. Jd Cole is admitted with pregnancy at 39w3d for induction of labor. Prenatal course was complicated by LGA fetus. Please see prenatal records for details. Past Medical History:   Diagnosis Date    Pap smear for cervical cancer screening 2016    negative, HPV negative    Routine Papanicolaou smear 10/1/12    Negative (no hpv)     Past Surgical History:   Procedure Laterality Date    HX OTHER SURGICAL      [place] lower left leg '98 '00     Social History     Occupational History    Not on file. Social History Main Topics    Smoking status: Never Smoker    Smokeless tobacco: Never Used    Alcohol use No    Drug use: No    Sexual activity: Yes     Partners: Male     Birth control/ protection: None     Family History   Problem Relation Age of Onset    No Known Problems Mother     Parkinson's Disease Father     No Known Problems Other        Allergies   Allergen Reactions    Penicillins Rash and Nausea and Vomiting    Sulfa (Sulfonamide Antibiotics) Hives     Prior to Admission medications    Medication Sig Start Date End Date Taking? Authorizing Provider   acetaminophen (TYLENOL) 325 mg tablet Take  by mouth every four (4) hours as needed for Pain. Yes Historical Provider   prenatal vit-fe fum-fa-dss (PRENATAL 19) 29-1 mg Tab Take  by mouth. Yes Historical Provider   acetaminophen-codeine (TYLENOL-CODEINE #3) 300-30 mg per tablet Take 1 Tab by mouth every four (4) hours as needed for Pain. Max Daily Amount: 6 Tabs. 3/1/17   Debora Núñez MD   PSEUDOEPHEDRINE HCL (SUDAFED PO) Take  by mouth.     Historical Provider GUAIFENESIN/PSEUDOEPHEDRNE HCL (ROBITUSSIN PE PO) Take  by mouth. Historical Provider   calcium carbonate (MYLANTA) 400 mg Chew Take 1 Tab by mouth three (3) times daily (with meals). Historical Provider        Active Hospital Problems    Diagnosis Date Noted    Pregnancy 08/29/2016     IUI - Dr. Carl Alarcon  [facility] 03/25/2017 based on IUI c/w LMP  NT: WNL 09/15/2016  [facility]  MSAFP: WNL 10/11/2016   xy  Fu check heart 4wk  1/12/17 hgb #10.9  LGA: interested in IOL if favorable   IOL  3/21. Pt notified. Pitocin order on book 3/20/2017           Review of Systems: A comprehensive review of systems was negative except for that written in the HPI. Constitutional: negative for fevers, chills and weight loss  ENT ROS: negative for - hearing change, oral lesions or visual changes  Respiratory: negative for cough, wheezing or dyspnea on exertion  Cardiovascular: negative for chest pain, irregular heart beats, exertional chest pressure/discomfort  Gastrointestinal: negative for dysphagia, nausea and vomiting  Genito-Urinary ROS: see HPI  Inteument/breast: negative for rash, breast lump and nipple discharge  Musculoskeletal:negative for stiff joints, neck pain and muscle weakness  Endocrine ROS: negative for - breast changes, galactorrhea or temperature intolerance  Hematological and Lymphatic ROS: negative for - bruising or swollen lymph nodes          Objective:     Vitals:  Vitals:    03/21/17 0803 03/21/17 0808 03/21/17 0813 03/21/17 0818   BP:       Pulse:       Resp:       Temp:       SpO2: 100% 100% 100% 100%   Weight:       Height:              Physical Exam:  Patient without distress.   Heart: Regular rate and rhythm or S1S2 present  Lung: clear to auscultation throughout lung fields, no wheezes, no rales, no rhonchi and normal respiratory effort  Abdomen: soft, nontender  Fundus: soft and non tender  Cervical Exam: 50/3/-3/vtx/soft mid  Pelvimetry: hollow sacrum, non prominent ischial spines, subpubic angle >90 degrees, parallel vaginal sidewalls    Lower Extremities:  - No cords or calf tenderness. Membranes:  Artificial Rupture of Membranes; Amniotic Fluid: small amount of clear fluid  Fetal Heart Rate: Baseline: 130 per minute  Variability: moderate  Accelerations: yes  Decelerations: none  TOCO: q 3-5 min    Prenatal Labs:   Lab Results   Component Value Date/Time    Rubella, External 11.20 2016    GrBStrep, External Negative 2017    HBsAg, External negative 2016    HIV, External non-reactive 2016    RPR, External non-reactive 2016    Gonorrhea, External negative 2016    Chlamydia, External negative 2016        Assessment/Plan:   33 y.o.  39w3d  IOL  GBS Negative  Reassuring fetal surveillance    Plan: Admit for IOL   Disc risks of LGA and CS/shoulder dystocia.    CEFM/TOCO    Signed By:  Severo Vela MD     2017

## 2017-03-22 PROCEDURE — 74011250636 HC RX REV CODE- 250/636

## 2017-03-22 PROCEDURE — 77030018846 HC SOL IRR STRL H20 ICUM -A

## 2017-03-22 PROCEDURE — 65270000029 HC RM PRIVATE

## 2017-03-22 PROCEDURE — 74011250637 HC RX REV CODE- 250/637: Performed by: OBSTETRICS & GYNECOLOGY

## 2017-03-22 RX ORDER — MORPHINE SULFATE 4 MG/ML
INJECTION, SOLUTION INTRAMUSCULAR; INTRAVENOUS
Status: DISPENSED
Start: 2017-03-22 | End: 2017-03-23

## 2017-03-22 RX ADMIN — IBUPROFEN 800 MG: 800 TABLET, FILM COATED ORAL at 11:21

## 2017-03-22 RX ADMIN — OXYCODONE HYDROCHLORIDE AND ACETAMINOPHEN 1 TABLET: 5; 325 TABLET ORAL at 06:16

## 2017-03-22 RX ADMIN — IBUPROFEN 800 MG: 800 TABLET, FILM COATED ORAL at 19:55

## 2017-03-22 RX ADMIN — IBUPROFEN 800 MG: 800 TABLET, FILM COATED ORAL at 03:04

## 2017-03-22 RX ADMIN — DOCUSATE SODIUM 100 MG: 100 CAPSULE ORAL at 09:15

## 2017-03-22 RX ADMIN — OXYCODONE HYDROCHLORIDE AND ACETAMINOPHEN 1 TABLET: 5; 325 TABLET ORAL at 19:55

## 2017-03-22 NOTE — PROGRESS NOTES
Bedside and Verbal shift change report given to [address] (oncoming nurse) by Lweis Todd RNC (offgoing nurse). Report included the following information SBAR, Kardex and MAR.

## 2017-03-22 NOTE — PROGRESS NOTES
TRANSFER - IN REPORT:    Verbal report received from [facility] RN(name) on [address] [facility]  being received from MIU(unit) for routine progression of care      Report consisted of patients Situation, Background, Assessment and   Recommendations(SBAR). Information from the following report(s) SBAR, Kardex, Procedure Summary, Intake/Output and MAR was reviewed with the receiving nurse. Opportunity for questions and clarification was provided. Assessment completed upon patients arrival to unit and care assumed.

## 2017-03-22 NOTE — PROGRESS NOTES
SBAR IN Report: Mother    Verbal report received from EMELY Mejia RN (full name & credentials) on this patient, who is now being transferred from L&D (unit) for routine progression of care. Report consisted of patient's Situation, Background, Assessment and Recommendations (SBAR).  ID bands were compared with the identification form, and verified with the patient and transferring nurse. Information from the SBAR, Kardex, Intake/Output, MAR and Recent Results and the [facility] Report was reviewed with the transferring nurse; opportunity for questions and clarification provided.

## 2017-03-22 NOTE — PROGRESS NOTES
Post-Partum Progress Note    Patient doing well post-partum without significant complaint. She is voiding without difficulty, she reports normal lochia. Her pain is well controlled with oral pain medication. She is tolerating a general diet. Delivery information:  Information for the patient's :  Kia Holder, Male [[id number]]   Delivery of a 9 lb 2.4 oz (4.15 kg) male infant via Vaginal, Spontaneous Delivery on 3/21/2017 at 4:59 PM  by . Apgars were 8 and 9. Vitals:  Patient Vitals for the past 8 hrs:   BP Temp Pulse Resp   17 0728 109/61 98.3 °F (36.8 °C) 91 18     Temp (24hrs), Av.3 °F (36.8 °C), Min:97.5 °F (36.4 °C), Max:98.7 °F (37.1 °C)    Visit Vitals    /61 (BP 1 Location: Right arm, BP Patient Position: Sitting)    Pulse 91    Temp 98.3 °F (36.8 °C)    Resp 18    Ht 5' 8\" (1.727 m)    Wt 242 lb (109.8 kg)    SpO2 100%    Breastfeeding Unknown    BMI 36.8 kg/m2       Exam:    General: Patient without distress. Abdomen: soft, fundus firm at level of umbilicus, nontender  Lower extremities: negative for cords or tenderness. Labs: No results found for this or any previous visit (from the past 24 hour(s)). Assessment:    1. Postpartum S/P spontaneous vaginal delivery   2. Patient doing well without significant complications    Plan:  1. Continue routine postpartum care  2. Routine perineal care and maternal education   3.  Oral pain medications and bowel regimen as needed                Bhavesh Henry MD

## 2017-03-22 NOTE — PROGRESS NOTES
Problem: Lactation Care Plan  Goal: *Infant latching appropriately  Outcome: Progressing Towards Goal  Mom states baby has been nursing well. Has visitors and declined breast assessment. Pt will successfully establish breastfeeding by feeding in response to early feeding cues   or wake every 3h, will obtain deep latch, and will keep log of feedings/output. Taught to BF at hunger cues and or q 2-3 hrs and to offer 10-20 drops of hand expressed colostrum at any non-feeds. Breast- Feeding Assessment  Attends Breast-Feeding Classes: No  Breast-Feeding Experience: Yes  Breast Trauma/Surgery: No  Type/Quality: Good (per mom, not seen at breast)  Lactation Consultant Visits  Breast-Feedings: Good  (per mom, not seen at breast)               Goal: *Weight loss less than 10% of birth weight  Outcome: Progressing Towards Goal  Encouraged mom to attempt feeding every 2-3 hours in the first couple of days. Looking for 8-12 feedings in 24 hours. Don't limit baby at breast, allow baby to come of breast on it's own. Baby may want to feed more often then every 2-3 hours. Baby may not feed that often, but feedings should be attempted. If baby doesn't nurse,  Mom can hand express drops of colostrum and drip into baby's mouth, or  give to baby by finger feeding, cup feeding,or spoon feeding. Encouraged skin to skin. Mom agrees with plan. Problem: Patient Education: Go to Patient Education Activity  Goal: Patient/Family Education  Outcome: Progressing Towards Goal  Reviewed breastfeeding basics:  Supply and demand,  stomach size, early  Feeding cues, skin to skin, positioning and baby led latch-on, assymetrical latch with signs of good, deep latch vs shallow, feeding frequency and duration, and log sheet for tracking infant feedings and output. Breastfeeding Booklet and Warm line information given.   Discussed typical  weight loss and the importance of infant weight checks with pediatrician 1-2 post discharge. Discussed with mother her plan for feeding. Reviewed the benefits of exclusive breast milk feeding during the hospital stay. Informed her of the risks of using formula to supplement in the first few days of life as well as the benefits of successful breast milk feeding; referred her to the Breastfeeding booklet about this information. She acknowledges understanding of information reviewed and states that it is her plan to breast feed her infant. Will support her choice and offer additional information as needed.

## 2017-03-23 VITALS
OXYGEN SATURATION: 100 % | HEIGHT: 68 IN | DIASTOLIC BLOOD PRESSURE: 63 MMHG | TEMPERATURE: 98.5 F | HEART RATE: 82 BPM | WEIGHT: 242 LBS | BODY MASS INDEX: 36.68 KG/M2 | RESPIRATION RATE: 18 BRPM | SYSTOLIC BLOOD PRESSURE: 102 MMHG

## 2017-03-23 PROCEDURE — 74011250637 HC RX REV CODE- 250/637: Performed by: OBSTETRICS & GYNECOLOGY

## 2017-03-23 RX ADMIN — IBUPROFEN 800 MG: 800 TABLET, FILM COATED ORAL at 04:34

## 2017-03-23 RX ADMIN — IBUPROFEN 800 MG: 800 TABLET, FILM COATED ORAL at 12:49

## 2017-03-23 RX ADMIN — OXYCODONE HYDROCHLORIDE AND ACETAMINOPHEN 1 TABLET: 5; 325 TABLET ORAL at 00:03

## 2017-03-23 NOTE — PROGRESS NOTES
Post-Partum Progress Note    Patient doing well post-partum without significant complaint. She is voiding without difficulty, she reports normal lochia. Her pain is well controlled with oral pain medication. She is tolerating a general diet. Delivery information:  Information for the patient's :  Ethan Patel, Male [[id number]]   Delivery of a 9 lb 2.4 oz (4.15 kg) male infant via Vaginal, Spontaneous Delivery on 3/21/2017 at 4:59 PM  by . Apgars were 8 and 9. Vitals:  Patient Vitals for the past 8 hrs:   BP Temp Pulse Resp   17 0639 102/63 98.5 °F (36.9 °C) 82 18     Temp (24hrs), Av.5 °F (36.9 °C), Min:98.4 °F (36.9 °C), Max:98.7 °F (37.1 °C)    Visit Vitals    /63 (BP 1 Location: Right arm, BP Patient Position: At rest)    Pulse 82    Temp 98.5 °F (36.9 °C)    Resp 18    Ht 5' 8\" (1.727 m)    Wt 242 lb (109.8 kg)    SpO2 100%    Breastfeeding Unknown    BMI 36.8 kg/m2       Exam:    General: Patient without distress. Abdomen: soft, fundus firm at level of umbilicus, nontender  Lower extremities: negative for cords or tenderness. Labs: No results found for this or any previous visit (from the past 24 hour(s)). Assessment:    1. Postpartum S/P spontaneous vaginal delivery   2. Patient doing well without significant complications    Plan:  1. Continue routine postpartum care  2. Routine perineal care and maternal education   3.  Oral pain medications and bowel regimen as needed                Bhavesh Ponce MD

## 2017-03-23 NOTE — PROGRESS NOTES
Problem: Lactation Care Plan  Goal: *Infant latching appropriately  Outcome: Resolved/Met Date Met:  03/23/17  Mom comfortable and relaxed with breast feeding. Mom has not felt a need to call [address] [facility] when she nurses. Pt will successfully establish breastfeeding by feeding in response to early feeding cues   or wake every 3h, will obtain deep latch, and will keep log of feedings/output. Taught to BF at hunger cues and or q 2-3 hrs and to offer 10-20 drops of hand expressed colostrum at any non-feeds. Breast Assessment  Left Breast: Medium, Large  Left Nipple: Everted, Intact  Right Breast: Medium, Large  Right Nipple: Everted, Intact  Breast- Feeding Assessment  Attends Breast-Feeding Classes: No  Breast-Feeding Experience: Yes  Breast Trauma/Surgery: No  Type/Quality: Good (per mom, not seen at breast today.)  Lactation Consultant Visits  Breast-Feedings: Good                Goal: *Weight loss less than 10% of birth weight  Outcome: Resolved/Met Date Met:  03/23/17  Encouraged mom to  Look for 8-12 feedings in 24 hours. Don't limit baby at breast, allow baby to come of breast on it's own. Baby may want to feed more often then every 2-3 hours. Baby may not feed that often, but feedings should be attempted. If baby doesn't nurse,  Mom can hand express drops of colostrum and drip into baby's mouth, or  give to baby by finger feeding, cup feeding,or spoon feeding. Encouraged skin to skin. Mom agrees with plan. Problem: Patient Education: Go to Patient Education Activity  Goal: Patient/Family Education  Outcome: Resolved/Met Date Met:  03/23/17  Discussed eating a healthy diet. Instructed mother to eat a variety of foods in order to get a well balanced diet. She should consume an extra 300-500 calories per day (more than her non-pregnant requirement.) These extra calories will help provide energy needed for optimal breast milk production.  Mother also encouraged to \"drink to thirst\" and it is recommended that she drink fluids such as water and fruit/vegetable juice. Nutritious snacks should be available so that she can eat throughout the day to help satisfy her hunger and maintain a good milk supply. Continue taking your prenatal vitamins as long as you breast feed. Chart shows numerous feedings, void, stool WNL. Discussed Importance of monitoring outputs and feedings on first week of  Breastfeeding. Discussed ways to tell if baby getting enough, ie  Voids and stools, by day 7, baby should have at least  4-6 wet diapers a day, change in color of stool to a seedy yellow, and return to birth wt within 2 weeks with a steady increase after that. .  Follow up with pediatrician visit for weight check in 1-2 days reviewed. Discussed Breast feeding support groups and encouraged to call [employer] line number, E760015 or [facility] at [phone]  for any questions/problems that arise.        Call your doctor, midwife and/or lactation consultant if:  · Baby is having no wet or dirty diapers   · Baby has dark colored urine after day 3  (should be pale yellow to clear)   · Baby has dark colored stools after day 4  (should be mustard yellow, with no meconium)   · Baby has fewer wet/soiled diapers or nurses less   frequently than the goals listed here   · Mom has symptoms of mastitis   (sore breast with fever, chills, flu-like aching)

## 2017-03-23 NOTE — DISCHARGE INSTRUCTIONS
[facility]  [email or web address]  [address] [place], FACOG     POST DELIVERY DISCHARGE INSTRUCTIONS  FROM YOUR PHYSICIAN    Name: Edel Rojas  YOB: 1985    General:     Read all discharge information provided by the hospital    Diet/Diet Restrictions:  Eat healthy meals and snacks as desired. Eat foods that are high in fiber and low in fat and cholesterol. Drink eight 8-ounce glasses of water daily; avoid excessive caffeine intake. http://www.jude-levine.org/. html  EliteClients.be    Medications:   See discharge medication list and read instructions carefully. Breast Feeding:  See instructions from your lactation consult. Call [phone] for more information or to locate a lactation consultant. https://www.mullins.info/    Vaccines:  If you received the MMR vaccine postpartum you should wait three months until you get pregnant again. You, and close contacts, should make sure that the Tdap vaccine is up to date. This vaccine can decrease the risk of your baby getting pertussis or \"whooping cough. \"    You, and close contacts, should receive the influenza vaccine during flu season when appropriate. SalaryStart.tn    Tobacco Use: If you (or other people around the baby) smoke or use tobacco products, please try to  use and quit to improve your health and decrease risk to your baby. LimitBuy.nl. htm    Swelling in your Legs:  There are many fluid changes after delivery and you may have more swelling the first few days after delivery  Continue to drink plenty of water, avoid sitting or standing in one position for too long and elevate your feet above your heart, to help reduce some the pressure you may be feeling in your ankles and legs.      Section Incision:  Steri-strips or tape strips may be removed gently at home approximately 7- 10 days after surgery. Soaking the strips with a warm, wet cloth or taking a shower may make the strips easier to remove. Metal staples are usually removed within 3 to 10 days, either before you leave the hospital or in the office. Make an appointment if needed. Insorb absorbable staples may be used under the skin but you may see small white pieces as they dissolve. Skin glue or dermabond will fall off with time. Abdominal incisions should be kept clean by showering. It is not necessary to put soap on the incision; plain tap water is adequate. Avoid scrubbing the area and pat dry. The way your scar looks will change over time and may not reach its final appearance for up to a year. The area may feel either numb or sensitive to touch, which is normal.         Physical Activity / Restrictions / Safety:     Avoid heavy lifting, no more that 10 pounds, for 2-3 weeks. No driving while taking narcotic pain medication, of if you can not slam on the brakes. No intercourse for 4-6 weeks, no douching or tampon use until seen by your doctor for your postpartum visit. Use condoms as needed for contraception with sexual activity. You may resume normal exercise after you are cleared by your physician at your postpartum check. You may walk for exercise, as tolerated. Discharge Instructions/Special Treatment/Home Care Needs:     Continue your prenatal vitamins while breast feeding or pumping. Continue to use a squirt bottle with warm water on your perineum/bottom/episiotomy after each bathroom use until bleeding stops. Take stool softeners daily. For example, docusate over the counter stool softener. This is especially important if you are taking narcotic pain medications, because they can cause constipation. Call your doctor for the following:      If you have a fever over 100.4 degrees by mouth on two readings. If you have persistent vaginal bleeding heavier than a heavy menstrual period or persistent large clots or if you are bleeding so heavy it is making you feel weak. It is normal to pass larger clots when you first get out of bed: but if they persist, notify your physician. If you have red streaks or increased swelling of legs, painful red streaks on your breast.  If you have painful urination, or increased pain, redness or discharge with your incision. If you have any questions or concerns. Pain Management:     Take Acetaminophen (Tylenol), Ibuprofen (Advil, Motrin), prescribed pain medications as directed for pain. Do not take Perocet with Tylenol, they both contain acetaminophen. Use a warm water Sitz bath 3 times daily to relieve episiotomy, bottom/perineum or hemorrhoidal discomfort. Apply heating pad to  incision as needed. For hemorrhoidal discomfort, you can use Tucks and Anusol cream as needed and directed.     NSAID information for patients:  [email or web address]    Pain medication/narcotic information for patients:   Take your medicine exactly as prescribed   Store your medicine away from children and in a safe  place   Do not give your medicine to others   Do not drink alcohol while taking this medicine    Follow-Up Care:     Appointment with MD:  Dr. Alonso Andrea  [phone]  Schedule your postpartum visit for six weeks        Additional Discharge Instructions    Please read all of your discharge instructions  Follow all of your medication instructions carefully  Call our office on the next business day to schedule your follow-up appointment  If you have any questions or concerns, please contact us at [phone] or if the situation is urgent contact   Become aron [facility] My Chart user so you can access information, results and appointments: go to [email or web address]. The [facility] is to bring compassion to healthcare and to be good help to those in need. We aim at providing quality healthcare with an emphasis on respect, justice, compassion, stewardship, integrity, growth and innovation.     If you did not receive excellent communication, compassionate care and an outstanding patient experience, please notify Haven Aviles at [email or web address] or [phone] or discuss your concerns with me at your next visit so that we can meet our mission and your expectations    Catracho Connelly MD  [address] [place] [address], [address]  [email or web address]   [phone]   Good Help to Those in [facility]

## 2017-03-23 NOTE — PROGRESS NOTES
1145 Discharge complete. Breastfeeding infant. 12 Pt off unit in stable condition via wheelchair with volunteer for discharge home per Dr. Luis Tripp. Pt is to follow-up in 6 weeks and is aware. Prescriptions given to pt. Pt denies any HA, Dizziness, N/V or pain at this time. Infant in car seat with mother.

## 2017-03-23 NOTE — LACTATION NOTE
Problem: Lactation Care Plan  Goal: *Infant latching appropriately  Outcome: Resolved/Met Date Met:  03/23/17  Mom comfortable and relaxed with breast feeding. Mom has not felt a need to call [address] [facility] when she nurses.      Pt will successfully establish breastfeeding by feeding in response to early feeding cues   or wake every 3h, will obtain deep latch, and will keep log of feedings/output. Taught to BF at hunger cues and or q 2-3 hrs and to offer 10-20 drops of hand expressed colostrum at any non-feeds.       Breast Assessment  Left Breast: Medium, Large  Left Nipple: Everted, Intact  Right Breast: Medium, Large  Right Nipple: Everted, Intact  Breast- Feeding Assessment  Attends Breast-Feeding Classes: No  Breast-Feeding Experience: Yes  Breast Trauma/Surgery: No  Type/Quality: Good (per mom, not seen at breast today.)  Lactation Consultant Visits  Breast-Feedings: Good                 Goal: *Weight loss less than 10% of birth weight  Outcome: Resolved/Met Date Met:  03/23/17  Encouraged mom to Look for 8-12 feedings in 24 hours. Don't limit baby at breast, allow baby to come of breast on it's own. Baby may want to feed more often then every 2-3 hours. Baby may not feed that often, but feedings should be attempted. If baby doesn't nurse, Mom can hand express drops of colostrum and drip into baby's mouth, or give to baby by finger feeding, cup feeding,or spoon feeding. Encouraged skin to skin.      Mom agrees with plan.     Problem: Patient Education: Go to Patient Education Activity  Goal: Patient/Family Education  Outcome: Resolved/Met Date Met:  03/23/17  Discussed eating a healthy diet. Instructed mother to eat a variety of foods in order to get a well balanced diet. She should consume an extra 300-500 calories per day (more than her non-pregnant requirement.) These extra calories will help provide energy needed for optimal breast milk production.  Mother also encouraged to \"drink to thirst\" and it is recommended that she drink fluids such as water and fruit/vegetable juice. Nutritious snacks should be available so that she can eat throughout the day to help satisfy her hunger and maintain a good milk supply. Continue taking your prenatal vitamins as long as you breast feed.       Chart shows numerous feedings, void, stool WNL. Discussed Importance of monitoring outputs and feedings on first week of Breastfeeding. Discussed ways to tell if baby getting enough, ie Voids and stools, by day 7, baby should have at least 4-6 wet diapers a day, change in color of stool to a seedy yellow, and return to birth wt within 2 weeks with a steady increase after that. . Follow up with pediatrician visit for weight check in 1-2 days reviewed.  Discussed Breast feeding support groups and encouraged to call [employer] line number, [phone] or [facility] at [phone] for any questions/problems that arise.       Call your doctor, midwife and/or lactation consultant if:  · Baby is having no wet or dirty diapers   · Baby has dark colored urine after day 3  (should be pale yellow to clear)   · Baby has dark colored stools after day 4  (should be mustard yellow, with no meconium)   · Baby has fewer wet/soiled diapers or nurses less   frequently than the goals listed here   · Mom has symptoms of mastitis   (sore breast with fever, chills, flu-like aching)

## 2017-03-31 ENCOUNTER — TELEPHONE (OUTPATIENT)
Dept: OBGYN CLINIC | Age: 32
End: 2017-03-31

## 2017-03-31 NOTE — TELEPHONE ENCOUNTER
Patient calling stating that she received a bill for a claim that was denied. Patient was admitted for observation to rule out labor on 03/11/17 and patient states that she needs ABIMAEL to call for a peer review. After speaking with Angelique, Patient was advised that the bill she received was for hospital services and that [id number] has not billed her yet. Patient needs to contact the hospital billing number [phone] and discuss the denial with them. Patient appreciative of advice.

## 2017-05-09 ENCOUNTER — OFFICE VISIT (OUTPATIENT)
Dept: OBGYN CLINIC | Age: 32
End: 2017-05-09

## 2017-05-09 VITALS
SYSTOLIC BLOOD PRESSURE: 120 MMHG | WEIGHT: 223 LBS | BODY MASS INDEX: 33.8 KG/M2 | HEIGHT: 68 IN | DIASTOLIC BLOOD PRESSURE: 70 MMHG

## 2017-05-09 NOTE — LETTER
5/9/2017 8:23 AM 
 
Ms. Kia Sullivan [phone] To Whom it may concern; 
 
 Tony Yeboah is under my care. She was seen in our office today for Postpartum care. Following this appointment today, she may return to work. Sincerely, Marko Draper MD

## 2017-05-09 NOTE — MR AVS SNAPSHOT
Visit Information Date & Time Provider Department Dept. Phone Encounter #  
 5/9/2017  7:40 AM Fawn Jim MD [facility] [id number] Upcoming Health Maintenance Date Due INFLUENZA AGE 9 TO ADULT 8/1/2017 PAP AKA CERVICAL CYTOLOGY 8/18/2019 Allergies as of 5/9/2017  Review Complete On: 5/9/2017 By: Maggie Brennan LPN Severity Noted Reaction Type Reactions Penicillins  05/12/2013    Rash, Nausea and Vomiting  
 Sulfa (Sulfonamide Antibiotics)  05/12/2013    Hives Current Immunizations  Reviewed on 2/6/2017 Name Date Influenza Vaccine (Quad) PF 10/11/2016 Tdap 2/16/2017, 5/14/2013 12:35 PM  
  
 Not reviewed this visit Vitals BP Height(growth percentile) Weight(growth percentile) Breastfeeding? BMI OB Status 120/70 5' 8\" (1.727 m) 223 lb (101.2 kg) No 33.91 kg/m2 Recent pregnancy Smoking Status Never Smoker BMI and BSA Data Body Mass Index Body Surface Area  
 33.91 kg/m 2 2.2 m 2 Preferred Pharmacy Pharmacy Name Phone [id number] [phone] [facility], [address] [place] [address] Dr [phone] Your Updated Medication List  
  
   
This list is accurate as of: 5/9/17  7:55 AM.  Always use your most recent med list.  
  
  
  
  
 ibuprofen 600 mg tablet Commonly known as:  MOTRIN Take 1 Tab by mouth every six (6) hours as needed for Pain. Take with food. oxyCODONE-acetaminophen 5-325 mg per tablet Commonly known as:  PERCOCET  
1-2 Tablets every 4-6 hours as needed PRENATAL 19 (WITH DOCUSATE) 29-1 mg Tab Generic drug:  prenatal vit-fe fum-fa-dss Take  by mouth. Patient Instructions After Pregnancy: Exercises Your Care Instructions Here are some examples of exercises that can help after pregnancy. Start each exercise slowly. Ease off the exercise if you start to have pain. Your doctor or physical therapist will tell you when you can start these exercises and which ones will work best for you. How to do the exercises Tummy tuck 1. Lie on your back, and place two fingers just inside your hip bones so you can feel your lower belly muscles. 2. Take a deep breath in. 
3. As you breathe out, pull your belly button in toward your spine, as if you are trying to zip up a tight pair of jeans. You should feel your lower belly muscles pull slightly away from your fingers as the muscles tighten. 4. Hold for about 6 seconds, but do not hold your breath. 5. Repeat 8 to 12 times. 6. Repeat several times a day, and try to hold your lower belly muscles in for longer as you get stronger. 7. Practice doing this exercise while you are standing, such as when you are standing in line, or sitting. Heel slides 1. Lie on the floor with your knees bent, and place two fingers just inside your hip bones so you can feel your lower belly muscles. Your feet should be flat on the floor. 2. Pull your belly button in toward your spine. You should feel your lower belly muscles pull slightly away from your fingers as the muscles tighten. 3. Keep holding your belly button in as you slowly slide one foot along the floor until your leg is out straight. 4. Slowly slide the leg back to your starting position while making sure that you keep holding your belly button in. 
5. Do not arch or move your back as you are doing this. Do not hold your breath. 6. Relax and repeat with your other leg. 7. Repeat 8 to 12 times. Knee-to-chest stretch 1. Lie on your back with one knee bent and the other leg straight. 2. Clasp your hands together under your bent knee and bring the knee to your chest. Keep your lower back pressed to the floor. 3. If it hurts your back to keep your opposite leg straight as you stretch, bend that knee too, and keep that foot flat on the floor. 4. Hold for at least 15 to 30 seconds. 5. Relax and lower the knee to the starting position. 6. Repeat with the other leg. 7. Repeat 2 to 4 times with each leg. Neck rotation 1. Sit in a firm chair, or stand up straight. 2. Keeping your chin level, turn your head to the right, and hold for 15 to 30 seconds. 3. Turn your head to the left and hold for 15 to 30 seconds. 4. Repeat 2 to 4 times to each side. Shoulder rolls 1. Sit comfortably with your feet shoulder-width apart. You can also do this exercise while standing. 2. Roll your shoulders up, then back, and then down in a smooth, circular motion. 3. Repeat 2 to 4 times. Midback stretch Note: If you have knee pain, do not do this exercise. 1. Kneel on the floor, and sit back on your ankles. 2. Lean forward, place your hands on the floor, and stretch your arms out in front of you. Rest your head between your arms. 3. Gently push your chest toward the floor, reaching as far in front of you as possible. 4. Hold for 15 to 30 seconds. 5. Repeat 2 to 4 times. Back stretches 1. Get down on your hands and knees on the floor. 2. Relax your head and allow it to droop. Round your back up toward the ceiling until you feel a nice stretch in your upper, middle, and lower back. Hold this stretch for as long as it feels comfortable, or about 15 to 30 seconds. 3. Return to the starting position with a flat back while you are on your hands and knees. 4. Let your back sway by pressing your stomach toward the floor. Lift your buttocks toward the ceiling. 5. Hold this position for 15 to 30 seconds. 6. Repeat 2 to 4 times. Hamstring stretch (lying down) 1. Lie flat on your back with your legs straight. If you feel discomfort in your back, place a small towel roll under your lower back. 2. Holding the back of your leg, lift your leg straight up and toward your body until you feel a stretch at the back of your thigh. 3. Hold the stretch for at least 30 seconds. 4. Repeat 2 to 4 times. 5. Switch legs and repeat steps 1 through 4. Calf stretch 1. Stand facing a wall with your hands on the wall at about eye level. Put your leg about a step behind your other leg. 2. Keeping your back leg straight and your back heel on the floor, bend your front knee and gently bring your hip and chest toward the wall until you feel a stretch in the calf of your back leg. 3. Hold the stretch for 15 to 30 seconds. 4. Repeat 2 to 4 times. 5. Repeat steps 1 through 4, but this time keep your back knee bent. 6. Switch legs and repeat steps 1 through 5. Follow-up care is a key part of your treatment and safety. Be sure to make and go to all appointments, and call your doctor if you are having problems. It's also a good idea to know your test results and keep a list of the medicines you take. Where can you learn more? Go to http://ryne-marvin.info/. Johanny Calderón in the search box to learn more about \"After Pregnancy: Exercises. \" Current as of: May 30, 2016 Content Version: 11.2 © 5925-5070 [employer]. Care instructions adapted under license by [employer] (which disclaims liability or warranty for this information). If you have questions about a medical condition or this instruction, always ask your healthcare professional. [place] [address] any warranty or liability for your use of this information. Introducing [facility]! Dear Dirk Bernal: Thank you for requesting a [employer] account. Our records indicate that you already have an active [employer] account. You can access your account anytime at [email or web address] Did you know that you can access your hospital and ER discharge instructions at any time in [employer]? You can also review all of your test results from your hospital stay or ER visit. Additional Information If you have questions, please visit the Frequently Asked Questions section of the [employer] website at [email or web address] Remember, [employer] is NOT to be used for urgent needs. For medical emergencies, dial 911. Now available from your iPhone and Android! Please provide this summary of care documentation to your next provider. Your primary care clinician is listed as Tevin Leahy. If you have any questions after today's visit, please call [phone].

## 2017-05-09 NOTE — PATIENT INSTRUCTIONS
After Pregnancy: Exercises  Your Care Instructions  Here are some examples of exercises that can help after pregnancy. Start each exercise slowly. Ease off the exercise if you start to have pain. Your doctor or physical therapist will tell you when you can start these exercises and which ones will work best for you. How to do the exercises  Tummy tuck    1. Lie on your back, and place two fingers just inside your hip bones so you can feel your lower belly muscles. 2. Take a deep breath in.  3. As you breathe out, pull your belly button in toward your spine, as if you are trying to zip up a tight pair of jeans. You should feel your lower belly muscles pull slightly away from your fingers as the muscles tighten. 4. Hold for about 6 seconds, but do not hold your breath. 5. Repeat 8 to 12 times. 6. Repeat several times a day, and try to hold your lower belly muscles in for longer as you get stronger. 7. Practice doing this exercise while you are standing, such as when you are standing in line, or sitting. Heel slides    1. Lie on the floor with your knees bent, and place two fingers just inside your hip bones so you can feel your lower belly muscles. Your feet should be flat on the floor. 2. Pull your belly button in toward your spine. You should feel your lower belly muscles pull slightly away from your fingers as the muscles tighten. 3. Keep holding your belly button in as you slowly slide one foot along the floor until your leg is out straight. 4. Slowly slide the leg back to your starting position while making sure that you keep holding your belly button in.  5. Do not arch or move your back as you are doing this. Do not hold your breath. 6. Relax and repeat with your other leg. 7. Repeat 8 to 12 times. Knee-to-chest stretch    1. Lie on your back with one knee bent and the other leg straight.   2. Clasp your hands together under your bent knee and bring the knee to your chest. Keep your lower back pressed to the floor. 3. If it hurts your back to keep your opposite leg straight as you stretch, bend that knee too, and keep that foot flat on the floor. 4. Hold for at least 15 to 30 seconds. 5. Relax and lower the knee to the starting position. 6. Repeat with the other leg. 7. Repeat 2 to 4 times with each leg. Neck rotation    1. Sit in a firm chair, or stand up straight. 2. Keeping your chin level, turn your head to the right, and hold for 15 to 30 seconds. 3. Turn your head to the left and hold for 15 to 30 seconds. 4. Repeat 2 to 4 times to each side. Shoulder rolls    1. Sit comfortably with your feet shoulder-width apart. You can also do this exercise while standing. 2. Roll your shoulders up, then back, and then down in a smooth, circular motion. 3. Repeat 2 to 4 times. Midback stretch    Note: If you have knee pain, do not do this exercise. 1. Kneel on the floor, and sit back on your ankles. 2. Lean forward, place your hands on the floor, and stretch your arms out in front of you. Rest your head between your arms. 3. Gently push your chest toward the floor, reaching as far in front of you as possible. 4. Hold for 15 to 30 seconds. 5. Repeat 2 to 4 times. Back stretches    1. Get down on your hands and knees on the floor. 2. Relax your head and allow it to droop. Round your back up toward the ceiling until you feel a nice stretch in your upper, middle, and lower back. Hold this stretch for as long as it feels comfortable, or about 15 to 30 seconds. 3. Return to the starting position with a flat back while you are on your hands and knees. 4. Let your back sway by pressing your stomach toward the floor. Lift your buttocks toward the ceiling. 5. Hold this position for 15 to 30 seconds. 6. Repeat 2 to 4 times. Hamstring stretch (lying down)    1. Lie flat on your back with your legs straight.  If you feel discomfort in your back, place a small towel roll under your lower back.  2. Holding the back of your leg, lift your leg straight up and toward your body until you feel a stretch at the back of your thigh. 3. Hold the stretch for at least 30 seconds. 4. Repeat 2 to 4 times. 5. Switch legs and repeat steps 1 through 4. Calf stretch    1. Stand facing a wall with your hands on the wall at about eye level. Put your leg about a step behind your other leg. 2. Keeping your back leg straight and your back heel on the floor, bend your front knee and gently bring your hip and chest toward the wall until you feel a stretch in the calf of your back leg. 3. Hold the stretch for 15 to 30 seconds. 4. Repeat 2 to 4 times. 5. Repeat steps 1 through 4, but this time keep your back knee bent. 6. Switch legs and repeat steps 1 through 5. Follow-up care is a key part of your treatment and safety. Be sure to make and go to all appointments, and call your doctor if you are having problems. It's also a good idea to know your test results and keep a list of the medicines you take. Where can you learn more? Go to http://ryne-marvin.info/. Zuleima Fine in the search box to learn more about \"After Pregnancy: Exercises. \"  Current as of: May 30, 2016  Content Version: 11.2  © 9592-3721 [employer]. Care instructions adapted under license by [employer] (which disclaims liability or warranty for this information). If you have questions about a medical condition or this instruction, always ask your healthcare professional. [place] [address] any warranty or liability for your use of this information.

## 2017-05-09 NOTE — PROGRESS NOTES
Mesha Douglas MD, FACOG     Postpartum visit    Chief Complaint   Patient presents with   Arnaldo Call is a 33 y.o. female G2 [phone] who presents for a postpartum exam.     She is now 7 weeks from a vaginal delivery delivery. She had some leg pain on both sides when she was sitting, better when she moves around but it has mostly resolved. No LMP recorded. She has had the following significant problems since her delivery: none. She reports her mood as Good. The patient is not breastfeeding/pumping breast milk for her baby. The patient is not interested in birth control. She is due for her next AE in 3 months. Past Medical History:   Diagnosis Date    Pap smear for cervical cancer screening 2016    negative, HPV negative    Routine Papanicolaou smear 10/1/12    Negative (no hpv)     Past Surgical History:   Procedure Laterality Date    HX OTHER SURGICAL      [place] lower left leg '98 '00     Current Outpatient Prescriptions   Medication Sig    prenatal vit-fe fum-fa-dss (PRENATAL 19) 29-1 mg Tab Take  by mouth. No current facility-administered medications for this visit. Allergies   Allergen Reactions    Penicillins Rash and Nausea and Vomiting    Sulfa (Sulfonamide Antibiotics) Hives     Family History   Problem Relation Age of Onset    No Known Problems Mother     Parkinson's Disease Father     No Known Problems Other      Social History     Social History    Marital status:      Spouse name: N/A    Number of children: N/A    Years of education: N/A     Occupational History    Not on file.      Social History Main Topics    Smoking status: Never Smoker    Smokeless tobacco: Never Used    Alcohol use No    Drug use: No    Sexual activity: Yes     Partners: Male     Birth control/ protection: None     Other Topics Concern    Not on file     Social History Narrative     OB History      Para Term  AB TAB SAB Ectopic Multiple Living    2 2 2      0 2        Obstetric Comments    Pushed 10 minutes. Labor augmented pitocin and AROM. Immunization History   Administered Date(s) Administered    Influenza Vaccine (Quad) PF 10/11/2016    Tdap 05/14/2013, 02/16/2017       Review of Systems:  History obtained from the patient  General ROS: negative for - chills, fever or weight loss  Respiratory ROS: no cough, shortness of breath, or wheezing  Cardiovascular ROS: no chest pain or dyspnea on exertion  Gastrointestinal ROS: negative for - appetite loss, change in bowel habits or nausea/vomiting  Genito-Urinary ROS: negative except for as noted in HPI    PHYSICAL EXAMINATION  Visit Vitals    /70    Ht 5' 8\" (1.727 m)    Wt 223 lb (101.2 kg)    Breastfeeding No    BMI 33.91 kg/m2       Constitutional  · Appearance: well-nourished, well developed, alert, in no acute distress    HENT  · Head and Face: appears normal    Neck  · Inspection/Palpation: normal appearance, no masses or tenderness  · Lymph Nodes: no lymphadenopathy present  · Thyroid: gland size normal, nontender, no nodules or masses present on palpation    Chest  clear to auscultation, no wheezes, rales or rhonchi, symmetric air entry. Cardiac/CVS  normal rate, regular rhythm, normal S1, S2, no murmurs, rubs, clicks or gallops.     Breasts  · Inspection of Breasts: breasts symmetrical, no skin changes, no discharge present, nipple appearance normal, no skin retraction present  · Palpation of Breasts and Axillae: no masses present on palpation, no breast tenderness  · Axillary Lymph Nodes: no lymphadenopathy present    Gastrointestinal  · Abdominal Examination: abdomen non-tender to palpation, normal bowel sounds, no masses present  · Liver and spleen: no hepatomegaly present, spleen not palpable  · Hernias: no hernias identified    Genitourinary  · External Genitalia: normal appearance for age, no discharge present, no tenderness present, no inflammatory lesions present, no masses present, no atrophy present  · Vagina: normal vaginal vault without central or paravaginal defects, no discharge present, no inflammatory lesions present, no masses present  · Bladder: non-tender to palpation  · Urethra: appears normal  · Cervix: normal   · Uterus: normal size, shape and consistency  · Adnexa: no adnexal tenderness present, no adnexal masses present  · Perineum: perineum within normal limits, no evidence of trauma, no rashes or skin lesions present  · Anus: anus within normal limits  · Inguinal Lymph Nodes: no lymphadenopathy present    Skin  · General Inspection: no rash, no lesions identified    Neurologic/Psychiatric  · Mental Status:  · Orientation: grossly oriented to person, place and time  · Mood and Affect: mood normal, affect appropriate    Assessment:  Normal postpartum check  Encounter Diagnosis   Name Primary?  Routine postpartum follow-up Yes       Plan:  RTO for AE or sooner prn  Discussed contraception options; r/b/a. Planned contraception: declined   She should return to normal activity  We recommend healthy balanced diet, regular exercise  We discussed safer sex practices, condom use and risk factors for sexually transmitted diseases.    Patient should notify MD if she cannot resume normal activity and exercise  Recommended continuing prenatal vitamins/folic acid  Notify MD if leg sx recur

## 2018-03-02 ENCOUNTER — HOSPITAL ENCOUNTER (OUTPATIENT)
Dept: MRI IMAGING | Age: 33
Discharge: HOME | End: 2018-03-02

## 2018-03-02 DIAGNOSIS — H91.21 SUDDEN IDIOPATHIC HEARING LOSS OF RIGHT EAR: ICD-10-CM

## 2018-03-02 DIAGNOSIS — H93.11 TINNITUS OF RIGHT EAR: ICD-10-CM

## 2018-03-02 PROCEDURE — 70553 MRI BRAIN STEM W/O & W/DYE: CPT

## 2018-03-02 PROCEDURE — A9576 INJ PROHANCE MULTIPACK: HCPCS | Performed by: RADIOLOGY

## 2020-01-30 NOTE — PROGRESS NOTES
Bedside and Verbal shift change report given to URSULA Bowden (oncoming nurse) by EMELY Hidalgo RN (offgoing nurse). Report included the following information SBAR, Kardex, Intake/Output, MAR and Recent Results. Medicare Preventive Visit Patient Instructions  Thank you for completing your Welcome to Medicare Visit or Medicare Annual Wellness Visit today  Your next wellness visit will be due in one year (1/30/2021)  The screening/preventive services that you may require over the next 5-10 years are detailed below  Some tests may not apply to you based off risk factors and/or age  Screening tests ordered at today's visit but not completed yet may show as past due  Also, please note that scanned in results may not display below  Preventive Screenings:  Service Recommendations Previous Testing/Comments   Colorectal Cancer Screening  · Colonoscopy    · Fecal Occult Blood Test (FOBT)/Fecal Immunochemical Test (FIT)  · Fecal DNA/Cologuard Test  · Flexible Sigmoidoscopy Age: 54-65 years old   Colonoscopy: every 10 years (May be performed more frequently if at higher risk)  OR  FOBT/FIT: every 1 year  OR  Cologuard: every 3 years  OR  Sigmoidoscopy: every 5 years  Screening may be recommended earlier than age 48 if at higher risk for colorectal cancer  Also, an individualized decision between you and your healthcare provider will decide whether screening between the ages of 74-80 would be appropriate   Colonoscopy: 09/12/2017  FOBT/FIT: Not on file  Cologuard: Not on file  Sigmoidoscopy: Not on file    Screening Current     Prostate Cancer Screening Individualized decision between patient and health care provider in men between ages of 53-78   Medicare will cover every 12 months beginning on the day after your 50th birthday PSA: 0 4 ng/mL          Hepatitis C Screening Once for adults born between 1945 and 1965  More frequently in patients at high risk for Hepatitis C Hep C Antibody: 09/17/2019    Screening Current   Diabetes Screening 1-2 times per year if you're at risk for diabetes or have pre-diabetes Fasting glucose: 122 mg/dL   A1C: 5 8    Screening Current   Cholesterol Screening Once every 5 years if you don't have a lipid disorder  May order more often based on risk factors  Lipid panel: 09/17/2019    Screening Not Indicated  History Lipid Disorder      Other Preventive Screenings Covered by Medicare:  1  Abdominal Aortic Aneurysm (AAA) Screening: covered once if your at risk  You're considered to be at risk if you have a family history of AAA or a male between the age of 73-68 who smoking at least 100 cigarettes in your lifetime  2  Lung Cancer Screening: covers low dose CT scan once per year if you meet all of the following conditions: (1) Age 50-69; (2) No signs or symptoms of lung cancer; (3) Current smoker or have quit smoking within the last 15 years; (4) You have a tobacco smoking history of at least 30 pack years (packs per day x number of years you smoked); (5) You get a written order from a healthcare provider  3  Glaucoma Screening: covered annually if you're considered high risk: (1) You have diabetes OR (2) Family history of glaucoma OR (3)  aged 48 and older OR (3)  American aged 72 and older  3  Osteoporosis Screening: covered every 2 years if you meet one of the following conditions: (1) Have a vertebral abnormality; (2) On glucocorticoid therapy for more than 3 months; (3) Have primary hyperparathyroidism; (4) On osteoporosis medications and need to assess response to drug therapy  5  HIV Screening: covered annually if you're between the age of 12-76  Also covered annually if you are younger than 13 and older than 72 with risk factors for HIV infection  For pregnant patients, it is covered up to 3 times per pregnancy      Immunizations:  Immunization Recommendations   Influenza Vaccine Annual influenza vaccination during flu season is recommended for all persons aged >= 6 months who do not have contraindications   Pneumococcal Vaccine (Prevnar and Pneumovax)  * Prevnar = PCV13  * Pneumovax = PPSV23 Adults 25-60 years old: 1-3 doses may be recommended based on certain risk factors  Adults 72 years old: Prevnar (PCV13) vaccine recommended followed by Pneumovax (PPSV23) vaccine  If already received PPSV23 since turning 65, then PCV13 recommended at least one year after PPSV23 dose  Hepatitis B Vaccine 3 dose series if at intermediate or high risk (ex: diabetes, end stage renal disease, liver disease)   Tetanus (Td) Vaccine - COST NOT COVERED BY MEDICARE PART B Following completion of primary series, a booster dose should be given every 10 years to maintain immunity against tetanus  Td may also be given as tetanus wound prophylaxis  Tdap Vaccine - COST NOT COVERED BY MEDICARE PART B Recommended at least once for all adults  For pregnant patients, recommended with each pregnancy  Shingles Vaccine (Shingrix) - COST NOT COVERED BY MEDICARE PART B  2 shot series recommended in those aged 48 and above     Health Maintenance Due:      Topic Date Due    CRC Screening: Colonoscopy  09/12/2027    Hepatitis C Screening  Completed     Immunizations Due:      Topic Date Due    DTaP,Tdap,and Td Vaccines (1 - Tdap) 11/16/1961    Pneumococcal Vaccine: 65+ Years (2 of 2 - PPSV23) 06/19/2019     Advance Directives   What are advance directives? Advance directives are legal documents that state your wishes and plans for medical care  These plans are made ahead of time in case you lose your ability to make decisions for yourself  Advance directives can apply to any medical decision, such as the treatments you want, and if you want to donate organs  What are the types of advance directives? There are many types of advance directives, and each state has rules about how to use them  You may choose a combination of any of the following:  · Living will: This is a written record of the treatment you want  You can also choose which treatments you do not want, which to limit, and which to stop at a certain time  This includes surgery, medicine, IV fluid, and tube feedings     · Durable power of  for [facility]): This is a written record that states who you want to make healthcare choices for you when you are unable to make them for yourself  This person, called a proxy, is usually a family member or a friend  You may choose more than 1 proxy  · Do not resuscitate (DNR) order:  A DNR order is used in case your heart stops beating or you stop breathing  It is a request not to have certain forms of treatment, such as CPR  A DNR order may be included in other types of advance directives  · Medical directive: This covers the care that you want if you are in a coma, near death, or unable to make decisions for yourself  You can list the treatments you want for each condition  Treatment may include pain medicine, surgery, blood transfusions, dialysis, IV or tube feedings, and a ventilator (breathing machine)  · Values history: This document has questions about your views, beliefs, and how you feel and think about life  This information can help others choose the care that you would choose  Why are advance directives important? An advance directive helps you control your care  Although spoken wishes may be used, it is better to have your wishes written down  Spoken wishes can be misunderstood, or not followed  Treatments may be given even if you do not want them  An advance directive may make it easier for your family to make difficult choices about your care  Weight Management   Why it is important to manage your weight:  Being overweight increases your risk of health conditions such as heart disease, high blood pressure, type 2 diabetes, and certain types of cancer  It can also increase your risk for osteoarthritis, sleep apnea, and other respiratory problems  Aim for a slow, steady weight loss  Even a small amount of weight loss can lower your risk of health problems  How to lose weight safely:  A safe and healthy way to lose weight is to eat fewer calories and get regular exercise   You can lose up about 1 pound a week by decreasing the number of calories you eat by 500 calories each day  Healthy meal plan for weight management:  A healthy meal plan includes a variety of foods, contains fewer calories, and helps you stay healthy  A healthy meal plan includes the following:  · Eat whole-grain foods more often  A healthy meal plan should contain fiber  Fiber is the part of grains, fruits, and vegetables that is not broken down by your body  Whole-grain foods are healthy and provide extra fiber in your diet  Some examples of whole-grain foods are whole-wheat breads and pastas, oatmeal, brown rice, and bulgur  · Eat a variety of vegetables every day  Include dark, leafy greens such as spinach, kale, melissa greens, and mustard greens  Eat yellow and orange vegetables such as carrots, sweet potatoes, and winter squash  · Eat a variety of fruits every day  Choose fresh or canned fruit (canned in its own juice or light syrup) instead of juice  Fruit juice has very little or no fiber  · Eat low-fat dairy foods  Drink fat-free (skim) milk or 1% milk  Eat fat-free yogurt and low-fat cottage cheese  Try low-fat cheeses such as mozzarella and other reduced-fat cheeses  · Choose meat and other protein foods that are low in fat  Choose beans or other legumes such as split peas or lentils  Choose fish, skinless poultry (chicken or turkey), or lean cuts of red meat (beef or pork)  Before you cook meat or poultry, cut off any visible fat  · Use less fat and oil  Try baking foods instead of frying them  Add less fat, such as margarine, sour cream, regular salad dressing and mayonnaise to foods  Eat fewer high-fat foods  Some examples of high-fat foods include french fries, doughnuts, ice cream, and cakes  · Eat fewer sweets  Limit foods and drinks that are high in sugar  This includes candy, cookies, regular soda, and sweetened drinks    Exercise:  Exercise at least 30 minutes per day on most days of the week  Some examples of exercise include walking, biking, dancing, and swimming  You can also fit in more physical activity by taking the stairs instead of the elevator or parking farther away from stores  Ask your healthcare provider about the best exercise plan for you  © Copyright [employer] 2018 Information is for End User's use only and may not be sold, redistributed or otherwise used for commercial purposes   All illustrations and images included in CareNotes® are the copyrighted property of [employer]  or [address] [place] [address] [employer]